# Patient Record
Sex: MALE | Race: WHITE | Employment: FULL TIME | URBAN - NONMETROPOLITAN AREA
[De-identification: names, ages, dates, MRNs, and addresses within clinical notes are randomized per-mention and may not be internally consistent; named-entity substitution may affect disease eponyms.]

---

## 2017-03-24 LAB
ALBUMIN SERPL-MCNC: NORMAL G/DL
ALP BLD-CCNC: NORMAL U/L
ALT SERPL-CCNC: NORMAL U/L
AST SERPL-CCNC: NORMAL U/L
BILIRUB SERPL-MCNC: NORMAL MG/DL (ref 0.1–1.4)
BILIRUBIN, URINE: NORMAL
BLOOD, URINE: NORMAL
BUN BLDV-MCNC: NORMAL MG/DL
CALCIUM SERPL-MCNC: NORMAL MG/DL
CHLORIDE BLD-SCNC: NORMAL MMOL/L
CHOLESTEROL, TOTAL: NORMAL MG/DL
CHOLESTEROL/HDL RATIO: NORMAL
CLARITY: NORMAL
CO2: NORMAL MMOL/L
COLOR: NORMAL
CREAT SERPL-MCNC: NORMAL MG/DL
GFR CALCULATED: NORMAL
GLUCOSE BLD-MCNC: NORMAL MG/DL
GLUCOSE URINE: NORMAL
HDLC SERPL-MCNC: NORMAL MG/DL (ref 35–70)
KETONES, URINE: NORMAL
LDL CHOLESTEROL CALCULATED: NORMAL MG/DL (ref 0–160)
LEUKOCYTE ESTERASE, URINE: NORMAL
NITRITE, URINE: NORMAL
PH UA: NORMAL (ref 4.5–8)
POTASSIUM SERPL-SCNC: NORMAL MMOL/L
PROSTATE SPECIFIC ANTIGEN: 8.22 NG/ML
PROTEIN UA: NORMAL
SODIUM BLD-SCNC: NORMAL MMOL/L
SPECIFIC GRAVITY, URINE: NORMAL
TOTAL PROTEIN: NORMAL
TRIGL SERPL-MCNC: NORMAL MG/DL
UROBILINOGEN, URINE: NORMAL
VLDLC SERPL CALC-MCNC: NORMAL MG/DL

## 2017-03-30 ENCOUNTER — TELEPHONE (OUTPATIENT)
Dept: UROLOGY | Age: 62
End: 2017-03-30

## 2017-03-30 ENCOUNTER — OFFICE VISIT (OUTPATIENT)
Dept: UROLOGY | Age: 62
End: 2017-03-30

## 2017-03-30 VITALS
DIASTOLIC BLOOD PRESSURE: 58 MMHG | HEIGHT: 69 IN | SYSTOLIC BLOOD PRESSURE: 118 MMHG | BODY MASS INDEX: 26.66 KG/M2 | WEIGHT: 180 LBS

## 2017-03-30 DIAGNOSIS — N40.1 BENIGN NON-NODULAR PROSTATIC HYPERPLASIA WITH LOWER URINARY TRACT SYMPTOMS: ICD-10-CM

## 2017-03-30 DIAGNOSIS — R97.20 ELEVATED PSA: Primary | ICD-10-CM

## 2017-03-30 LAB
BILIRUBIN URINE: NEGATIVE
BLOOD URINE, POC: NEGATIVE
CHARACTER, URINE: CLEAR
COLOR, URINE: YELLOW
GLUCOSE URINE: NEGATIVE MG/DL
KETONES, URINE: NEGATIVE
LEUKOCYTE CLUMPS, URINE: NEGATIVE
NITRITE, URINE: NEGATIVE
PH, URINE: 7
PROTEIN, URINE: NEGATIVE MG/DL
SPECIFIC GRAVITY, URINE: 1.02 (ref 1–1.03)
UROBILINOGEN, URINE: 0.2 EU/DL

## 2017-03-30 PROCEDURE — G8484 FLU IMMUNIZE NO ADMIN: HCPCS | Performed by: UROLOGY

## 2017-03-30 PROCEDURE — G8419 CALC BMI OUT NRM PARAM NOF/U: HCPCS | Performed by: UROLOGY

## 2017-03-30 PROCEDURE — 99203 OFFICE O/P NEW LOW 30 MIN: CPT | Performed by: UROLOGY

## 2017-03-30 PROCEDURE — 3017F COLORECTAL CA SCREEN DOC REV: CPT | Performed by: UROLOGY

## 2017-03-30 PROCEDURE — 81003 URINALYSIS AUTO W/O SCOPE: CPT | Performed by: UROLOGY

## 2017-03-30 PROCEDURE — G8427 DOCREV CUR MEDS BY ELIG CLIN: HCPCS | Performed by: UROLOGY

## 2017-03-30 PROCEDURE — 1036F TOBACCO NON-USER: CPT | Performed by: UROLOGY

## 2017-03-30 RX ORDER — GENTAMICIN SULFATE 40 MG/ML
80 INJECTION, SOLUTION INTRAMUSCULAR; INTRAVENOUS ONCE
Qty: 2 ML | Refills: 0 | Status: SHIPPED | OUTPATIENT
Start: 2017-03-30 | End: 2017-03-30

## 2017-03-30 RX ORDER — CIPROFLOXACIN 500 MG/1
500 TABLET, FILM COATED ORAL EVERY 12 HOURS
Qty: 6 TABLET | Refills: 0 | Status: SHIPPED | OUTPATIENT
Start: 2017-03-30 | End: 2017-04-02

## 2017-03-30 ASSESSMENT — ENCOUNTER SYMPTOMS
FACIAL SWELLING: 0
NAUSEA: 0
SHORTNESS OF BREATH: 0
EYE PAIN: 0
BACK PAIN: 0
EYE REDNESS: 0
ABDOMINAL PAIN: 0
COLOR CHANGE: 0
CHEST TIGHTNESS: 0

## 2017-04-13 ENCOUNTER — PROCEDURE VISIT (OUTPATIENT)
Dept: UROLOGY | Age: 62
End: 2017-04-13

## 2017-04-13 VITALS
HEIGHT: 69 IN | BODY MASS INDEX: 26.66 KG/M2 | DIASTOLIC BLOOD PRESSURE: 60 MMHG | WEIGHT: 180 LBS | SYSTOLIC BLOOD PRESSURE: 128 MMHG

## 2017-04-13 DIAGNOSIS — R97.20 ELEVATED PSA: Primary | ICD-10-CM

## 2017-04-13 PROCEDURE — 1036F TOBACCO NON-USER: CPT | Performed by: UROLOGY

## 2017-04-13 PROCEDURE — 99999 PR OFFICE/OUTPT VISIT,PROCEDURE ONLY: CPT | Performed by: UROLOGY

## 2017-04-13 PROCEDURE — 76872 US TRANSRECTAL: CPT | Performed by: UROLOGY

## 2017-04-13 PROCEDURE — 55700 PR BIOPSY OF PROSTATE,NEEDLE/PUNCH: CPT | Performed by: UROLOGY

## 2017-04-13 PROCEDURE — 76942 ECHO GUIDE FOR BIOPSY: CPT | Performed by: UROLOGY

## 2017-04-13 PROCEDURE — 96372 THER/PROPH/DIAG INJ SC/IM: CPT | Performed by: UROLOGY

## 2017-04-13 RX ORDER — GENTAMICIN SULFATE 40 MG/ML
80 INJECTION, SOLUTION INTRAMUSCULAR; INTRAVENOUS ONCE
Status: COMPLETED | OUTPATIENT
Start: 2017-04-13 | End: 2017-04-13

## 2017-04-13 RX ADMIN — GENTAMICIN SULFATE 80 MG: 40 INJECTION, SOLUTION INTRAMUSCULAR; INTRAVENOUS at 16:19

## 2017-04-21 ENCOUNTER — OFFICE VISIT (OUTPATIENT)
Dept: UROLOGY | Age: 62
End: 2017-04-21

## 2017-04-21 VITALS
WEIGHT: 180 LBS | BODY MASS INDEX: 26.66 KG/M2 | DIASTOLIC BLOOD PRESSURE: 68 MMHG | SYSTOLIC BLOOD PRESSURE: 106 MMHG | HEIGHT: 69 IN

## 2017-04-21 DIAGNOSIS — C61 CANCER OF PROSTATE W/MED RECUR RISK (T2B-C OR GLEASON 7 OR PSA 10-20) (HCC): Primary | ICD-10-CM

## 2017-04-21 PROCEDURE — 1036F TOBACCO NON-USER: CPT | Performed by: UROLOGY

## 2017-04-21 PROCEDURE — 3017F COLORECTAL CA SCREEN DOC REV: CPT | Performed by: UROLOGY

## 2017-04-21 PROCEDURE — 99214 OFFICE O/P EST MOD 30 MIN: CPT | Performed by: UROLOGY

## 2017-04-21 PROCEDURE — G8427 DOCREV CUR MEDS BY ELIG CLIN: HCPCS | Performed by: UROLOGY

## 2017-04-21 PROCEDURE — G8419 CALC BMI OUT NRM PARAM NOF/U: HCPCS | Performed by: UROLOGY

## 2017-05-08 ENCOUNTER — OFFICE VISIT (OUTPATIENT)
Dept: UROLOGY | Age: 62
End: 2017-05-08

## 2017-05-08 VITALS
BODY MASS INDEX: 26.5 KG/M2 | WEIGHT: 178.9 LBS | HEIGHT: 69 IN | DIASTOLIC BLOOD PRESSURE: 76 MMHG | SYSTOLIC BLOOD PRESSURE: 124 MMHG

## 2017-05-08 DIAGNOSIS — C61 PROSTATE CANCER (HCC): Primary | ICD-10-CM

## 2017-05-08 LAB
BILIRUBIN, POC: NORMAL
BLOOD URINE, POC: NORMAL
CLARITY, POC: CLEAR
COLOR, POC: YELLOW
GLUCOSE URINE, POC: NORMAL
KETONES, POC: NORMAL
LEUKOCYTE EST, POC: NORMAL
NITRITE, POC: NORMAL
PH, POC: 5.5
PROTEIN, POC: NORMAL
SPECIFIC GRAVITY, POC: <=1.005
UROBILINOGEN, POC: NORMAL

## 2017-05-08 PROCEDURE — 81003 URINALYSIS AUTO W/O SCOPE: CPT | Performed by: UROLOGY

## 2017-05-08 PROCEDURE — 99214 OFFICE O/P EST MOD 30 MIN: CPT | Performed by: UROLOGY

## 2017-05-08 PROCEDURE — G8427 DOCREV CUR MEDS BY ELIG CLIN: HCPCS | Performed by: UROLOGY

## 2017-05-08 PROCEDURE — 1036F TOBACCO NON-USER: CPT | Performed by: UROLOGY

## 2017-05-08 PROCEDURE — G8419 CALC BMI OUT NRM PARAM NOF/U: HCPCS | Performed by: UROLOGY

## 2017-05-08 PROCEDURE — 3017F COLORECTAL CA SCREEN DOC REV: CPT | Performed by: UROLOGY

## 2017-05-09 ENCOUNTER — TELEPHONE (OUTPATIENT)
Dept: UROLOGY | Age: 62
End: 2017-05-09

## 2017-06-26 ENCOUNTER — TELEPHONE (OUTPATIENT)
Dept: UROLOGY | Age: 62
End: 2017-06-26

## 2017-06-26 ENCOUNTER — OFFICE VISIT (OUTPATIENT)
Dept: UROLOGY | Age: 62
End: 2017-06-26

## 2017-06-26 VITALS
BODY MASS INDEX: 26.6 KG/M2 | HEIGHT: 69 IN | DIASTOLIC BLOOD PRESSURE: 80 MMHG | SYSTOLIC BLOOD PRESSURE: 122 MMHG | WEIGHT: 179.6 LBS

## 2017-06-26 DIAGNOSIS — C61 PROSTATE CA (HCC): Primary | ICD-10-CM

## 2017-06-26 LAB
BILIRUBIN, POC: NORMAL
BLOOD URINE, POC: NORMAL
CLARITY, POC: CLEAR
COLOR, POC: YELLOW
GLUCOSE URINE, POC: NORMAL
KETONES, POC: NORMAL
LEUKOCYTE EST, POC: NORMAL
NITRITE, POC: NORMAL
PH, POC: 5.5
PROTEIN, POC: NORMAL
SPECIFIC GRAVITY, POC: 1.02
UROBILINOGEN, POC: NORMAL

## 2017-06-26 PROCEDURE — G8427 DOCREV CUR MEDS BY ELIG CLIN: HCPCS | Performed by: UROLOGY

## 2017-06-26 PROCEDURE — 3017F COLORECTAL CA SCREEN DOC REV: CPT | Performed by: UROLOGY

## 2017-06-26 PROCEDURE — 99214 OFFICE O/P EST MOD 30 MIN: CPT | Performed by: UROLOGY

## 2017-06-26 PROCEDURE — 1036F TOBACCO NON-USER: CPT | Performed by: UROLOGY

## 2017-06-26 PROCEDURE — G8419 CALC BMI OUT NRM PARAM NOF/U: HCPCS | Performed by: UROLOGY

## 2017-06-26 PROCEDURE — 81003 URINALYSIS AUTO W/O SCOPE: CPT | Performed by: UROLOGY

## 2017-06-27 ENCOUNTER — TELEPHONE (OUTPATIENT)
Dept: UROLOGY | Age: 62
End: 2017-06-27

## 2017-06-28 ENCOUNTER — TELEPHONE (OUTPATIENT)
Dept: UROLOGY | Age: 62
End: 2017-06-28

## 2017-06-28 DIAGNOSIS — C61 PROSTATE CANCER (HCC): Primary | ICD-10-CM

## 2017-06-28 DIAGNOSIS — Z01.818 PRE-OP TESTING: ICD-10-CM

## 2017-06-29 ENCOUNTER — TELEPHONE (OUTPATIENT)
Dept: UROLOGY | Age: 62
End: 2017-06-29

## 2017-06-29 LAB
ALBUMIN SERPL-MCNC: 4.2 G/DL
ALP BLD-CCNC: 69 U/L
ALT SERPL-CCNC: 24 U/L
AST SERPL-CCNC: 24 U/L
BASOPHILS ABSOLUTE: 0 /ΜL
BASOPHILS RELATIVE PERCENT: 0.5 %
BILIRUB SERPL-MCNC: 0.5 MG/DL (ref 0.1–1.4)
BILIRUBIN URINE: NORMAL MG/DL
BLOOD, URINE: NEGATIVE
BUN BLDV-MCNC: 15 MG/DL
CALCIUM SERPL-MCNC: 9.1 MG/DL
CHLORIDE BLD-SCNC: 102 MMOL/L
CLARITY: CLEAR
CO2: 26.8 MMOL/L
COLOR: NORMAL
CREAT SERPL-MCNC: 0.8 MG/DL
EOSINOPHILS ABSOLUTE: 0.1 /ΜL
EOSINOPHILS RELATIVE PERCENT: 1.9 %
GFR CALCULATED: NORMAL
GLUCOSE BLD-MCNC: 112 MG/DL
GLUCOSE URINE: NEGATIVE
HCT VFR BLD CALC: 41.2 % (ref 41–53)
HEMOGLOBIN: 13.9 G/DL (ref 13.5–17.5)
KETONES, URINE: NEGATIVE
LEUKOCYTE ESTERASE, URINE: NEGATIVE
LYMPHOCYTES ABSOLUTE: 1.5 /ΜL
LYMPHOCYTES RELATIVE PERCENT: 26.3 %
MCH RBC QN AUTO: 30.9 PG
MCHC RBC AUTO-ENTMCNC: 33.7 G/DL
MCV RBC AUTO: 91.6 FL
MONOCYTES ABSOLUTE: 0.6 /ΜL
MONOCYTES RELATIVE PERCENT: 11 %
NEUTROPHILS ABSOLUTE: 3.5 /ΜL
NEUTROPHILS RELATIVE PERCENT: 60.1 %
NITRITE, URINE: NEGATIVE
PDW BLD-RTO: 13.7 %
PH UA: 6 (ref 4.5–8)
PLATELET # BLD: 238 K/ΜL
PMV BLD AUTO: 10.2 FL
POTASSIUM SERPL-SCNC: 4 MMOL/L
PROTEIN UA: NEGATIVE
RBC # BLD: 4.5 10^6/ΜL
SODIUM BLD-SCNC: 140 MMOL/L
SPECIFIC GRAVITY UA: 1.02 (ref 1–1.03)
TOTAL PROTEIN: 6.5
UROBILINOGEN, URINE: NORMAL
WBC # BLD: 5.8 10^3/ML

## 2017-06-30 ENCOUNTER — TELEPHONE (OUTPATIENT)
Dept: UROLOGY | Age: 62
End: 2017-06-30

## 2017-07-05 ENCOUNTER — TELEPHONE (OUTPATIENT)
Dept: UROLOGY | Age: 62
End: 2017-07-05

## 2017-07-06 ENCOUNTER — TELEPHONE (OUTPATIENT)
Dept: UROLOGY | Age: 62
End: 2017-07-06

## 2017-07-14 ENCOUNTER — HOSPITAL ENCOUNTER (INPATIENT)
Dept: SURGERY | Age: 62
LOS: 2 days | Discharge: HOME | DRG: 708 | End: 2017-07-16
Attending: UROLOGY | Admitting: UROLOGY
Payer: COMMERCIAL

## 2017-07-14 LAB
ABO: NORMAL
ANTIBODY SCREEN: NORMAL
HCT VFR BLD CALC: 43.1 % (ref 42–52)
HEMOGLOBIN: 14.1 GM/DL (ref 14–18)
INR BLD: 0.97 (ref 0.85–1.13)
RH FACTOR: NORMAL

## 2017-07-14 PROCEDURE — 85018 HEMOGLOBIN: CPT

## 2017-07-14 PROCEDURE — 8E0W4CZ ROBOTIC ASSISTED PROCEDURE OF TRUNK REGION, PERCUTANEOUS ENDOSCOPIC APPROACH: ICD-10-PCS | Performed by: UROLOGY

## 2017-07-14 PROCEDURE — 55866 LAPS SURG PRST8ECT RPBIC RAD: CPT | Performed by: UROLOGY

## 2017-07-14 PROCEDURE — 0VT04ZZ RESECTION OF PROSTATE, PERCUTANEOUS ENDOSCOPIC APPROACH: ICD-10-PCS | Performed by: UROLOGY

## 2017-07-14 PROCEDURE — 86900 BLOOD TYPING SEROLOGIC ABO: CPT

## 2017-07-14 PROCEDURE — 86850 RBC ANTIBODY SCREEN: CPT

## 2017-07-14 PROCEDURE — 9990 CHARGE CONVERSION

## 2017-07-14 PROCEDURE — 07BC4ZX EXCISION OF PELVIS LYMPHATIC, PERCUTANEOUS ENDOSCOPIC APPROACH, DIAGNOSTIC: ICD-10-PCS | Performed by: UROLOGY

## 2017-07-14 PROCEDURE — 38571 LAPAROSCOPY LYMPHADENECTOMY: CPT | Performed by: UROLOGY

## 2017-07-14 PROCEDURE — 88309 TISSUE EXAM BY PATHOLOGIST: CPT

## 2017-07-14 PROCEDURE — 85014 HEMATOCRIT: CPT

## 2017-07-14 PROCEDURE — 86901 BLOOD TYPING SEROLOGIC RH(D): CPT

## 2017-07-14 PROCEDURE — 88307 TISSUE EXAM BY PATHOLOGIST: CPT

## 2017-07-14 RX ORDER — FENTANYL CITRATE 50 UG/ML
25 INJECTION, SOLUTION INTRAMUSCULAR; INTRAVENOUS EVERY 5 MIN PRN
Status: DISCONTINUED | OUTPATIENT
Start: 2017-07-14 | End: 2017-07-14

## 2017-07-14 RX ORDER — HYDROCODONE BITARTRATE AND ACETAMINOPHEN 5; 325 MG/1; MG/1
2 TABLET ORAL EVERY 4 HOURS PRN
Status: DISCONTINUED | OUTPATIENT
Start: 2017-07-14 | End: 2017-07-16 | Stop reason: HOSPADM

## 2017-07-14 RX ORDER — HYDROMORPHONE HCL 110MG/55ML
0.5 PATIENT CONTROLLED ANALGESIA SYRINGE INTRAVENOUS EVERY 5 MIN PRN
Status: DISCONTINUED | OUTPATIENT
Start: 2017-07-14 | End: 2017-07-14

## 2017-07-14 RX ORDER — HYDROCODONE BITARTRATE AND ACETAMINOPHEN 5; 325 MG/1; MG/1
1 TABLET ORAL EVERY 4 HOURS PRN
Status: DISCONTINUED | OUTPATIENT
Start: 2017-07-14 | End: 2017-07-16 | Stop reason: HOSPADM

## 2017-07-14 RX ORDER — DIAPER,BRIEF,INFANT-TODD,DISP
EACH MISCELLANEOUS 2 TIMES DAILY
Status: DISCONTINUED | OUTPATIENT
Start: 2017-07-14 | End: 2017-07-16 | Stop reason: HOSPADM

## 2017-07-14 RX ORDER — FENTANYL CITRATE 50 UG/ML
50 INJECTION, SOLUTION INTRAMUSCULAR; INTRAVENOUS EVERY 5 MIN PRN
Status: DISCONTINUED | OUTPATIENT
Start: 2017-07-14 | End: 2017-07-14

## 2017-07-14 RX ORDER — HYDROMORPHONE HCL 110MG/55ML
0.25 PATIENT CONTROLLED ANALGESIA SYRINGE INTRAVENOUS EVERY 5 MIN PRN
Status: DISCONTINUED | OUTPATIENT
Start: 2017-07-14 | End: 2017-07-14

## 2017-07-14 RX ORDER — SODIUM CHLORIDE 9 MG/ML
INJECTION, SOLUTION INTRAVENOUS CONTINUOUS
Status: DISCONTINUED | OUTPATIENT
Start: 2017-07-14 | End: 2017-07-16 | Stop reason: HOSPADM

## 2017-07-14 RX ORDER — ATROPA BELLADONNA AND OPIUM 16.2; 3 MG/1; MG/1
30 SUPPOSITORY RECTAL EVERY 8 HOURS PRN
Status: DISCONTINUED | OUTPATIENT
Start: 2017-07-14 | End: 2017-07-16 | Stop reason: HOSPADM

## 2017-07-14 RX ORDER — PROMETHAZINE HYDROCHLORIDE 25 MG/ML
12.5 INJECTION, SOLUTION INTRAMUSCULAR; INTRAVENOUS
Status: DISCONTINUED | OUTPATIENT
Start: 2017-07-14 | End: 2017-07-14

## 2017-07-14 RX ORDER — SODIUM CHLORIDE 0.9 % (FLUSH) 0.9 %
10 SYRINGE (ML) INJECTION EVERY 12 HOURS SCHEDULED
Status: DISCONTINUED | OUTPATIENT
Start: 2017-07-14 | End: 2017-07-16 | Stop reason: HOSPADM

## 2017-07-14 RX ORDER — MORPHINE SULFATE 4 MG/ML
4 INJECTION, SOLUTION INTRAMUSCULAR; INTRAVENOUS
Status: DISCONTINUED | OUTPATIENT
Start: 2017-07-14 | End: 2017-07-16 | Stop reason: HOSPADM

## 2017-07-14 RX ORDER — SODIUM CHLORIDE 0.9 % (FLUSH) 0.9 %
10 SYRINGE (ML) INJECTION PRN
Status: DISCONTINUED | OUTPATIENT
Start: 2017-07-14 | End: 2017-07-16 | Stop reason: HOSPADM

## 2017-07-14 RX ORDER — KETOROLAC TROMETHAMINE 30 MG/ML
15 INJECTION, SOLUTION INTRAMUSCULAR; INTRAVENOUS EVERY 6 HOURS
Status: DISCONTINUED | OUTPATIENT
Start: 2017-07-14 | End: 2017-07-16 | Stop reason: HOSPADM

## 2017-07-14 RX ORDER — DOCUSATE SODIUM 100 MG/1
100 CAPSULE, LIQUID FILLED ORAL 2 TIMES DAILY
Status: DISCONTINUED | OUTPATIENT
Start: 2017-07-14 | End: 2017-07-16 | Stop reason: HOSPADM

## 2017-07-14 RX ORDER — MEPERIDINE HYDROCHLORIDE 25 MG/ML
25 INJECTION INTRAMUSCULAR; INTRAVENOUS; SUBCUTANEOUS
Status: DISCONTINUED | OUTPATIENT
Start: 2017-07-14 | End: 2017-07-14

## 2017-07-14 RX ORDER — ACETAMINOPHEN 325 MG/1
650 TABLET ORAL EVERY 4 HOURS PRN
Status: DISCONTINUED | OUTPATIENT
Start: 2017-07-14 | End: 2017-07-16 | Stop reason: HOSPADM

## 2017-07-14 RX ORDER — MORPHINE SULFATE 2 MG/ML
2 INJECTION, SOLUTION INTRAMUSCULAR; INTRAVENOUS
Status: DISCONTINUED | OUTPATIENT
Start: 2017-07-14 | End: 2017-07-16 | Stop reason: HOSPADM

## 2017-07-14 RX ORDER — DIPHENHYDRAMINE HYDROCHLORIDE 50 MG/ML
12.5 INJECTION INTRAMUSCULAR; INTRAVENOUS
Status: DISCONTINUED | OUTPATIENT
Start: 2017-07-14 | End: 2017-07-14

## 2017-07-14 RX ORDER — ONDANSETRON 2 MG/ML
4 INJECTION INTRAMUSCULAR; INTRAVENOUS EVERY 6 HOURS PRN
Status: DISCONTINUED | OUTPATIENT
Start: 2017-07-14 | End: 2017-07-16 | Stop reason: HOSPADM

## 2017-07-14 RX ORDER — POTASSIUM CHLORIDE 7.45 MG/ML
10 INJECTION INTRAVENOUS PRN
Status: DISCONTINUED | OUTPATIENT
Start: 2017-07-14 | End: 2017-07-16 | Stop reason: HOSPADM

## 2017-07-14 RX ORDER — LABETALOL HYDROCHLORIDE 5 MG/ML
5 INJECTION, SOLUTION INTRAVENOUS EVERY 10 MIN PRN
Status: DISCONTINUED | OUTPATIENT
Start: 2017-07-14 | End: 2017-07-14

## 2017-07-14 RX ORDER — SODIUM CHLORIDE 9 MG/ML
INJECTION, SOLUTION INTRAVENOUS CONTINUOUS
Status: DISCONTINUED | OUTPATIENT
Start: 2017-07-14 | End: 2017-07-14

## 2017-07-14 RX ADMIN — SODIUM CHLORIDE: 9 INJECTION, SOLUTION INTRAVENOUS at 23:00

## 2017-07-14 RX ADMIN — Medication 0.5 MG: at 18:05

## 2017-07-14 RX ADMIN — Medication 0.5 MG: at 18:13

## 2017-07-14 RX ADMIN — BACITRACIN ZINC 1 G: 500 OINTMENT TOPICAL at 23:07

## 2017-07-14 RX ADMIN — KETOROLAC TROMETHAMINE 15 MG: 30 INJECTION, SOLUTION INTRAMUSCULAR at 23:07

## 2017-07-14 RX ADMIN — DOCUSATE SODIUM 100 MG: 100 CAPSULE ORAL at 23:07

## 2017-07-14 RX ADMIN — SODIUM CHLORIDE: 9 INJECTION, SOLUTION INTRAVENOUS at 11:29

## 2017-07-14 ASSESSMENT — PAIN SCALES - GENERAL
PAINLEVEL_OUTOF10: 6
PAINLEVEL_OUTOF10: 5
PAINLEVEL_OUTOF10: 7
PAINLEVEL_OUTOF10: 5
PAINLEVEL_OUTOF10: 7

## 2017-07-14 ASSESSMENT — PAIN - FUNCTIONAL ASSESSMENT: PAIN_FUNCTIONAL_ASSESSMENT: 0-10

## 2017-07-14 ASSESSMENT — PAIN DESCRIPTION - LOCATION: LOCATION: ABDOMEN

## 2017-07-14 ASSESSMENT — PAIN DESCRIPTION - DESCRIPTORS: DESCRIPTORS: ACHING

## 2017-07-14 ASSESSMENT — PAIN DESCRIPTION - PAIN TYPE: TYPE: ACUTE PAIN

## 2017-07-14 NOTE — OP NOTE
robot port was placed through this incision and the abdomen examined. There was pulsatile swelling obsered in the midline which was thought to be aortic aneurysm and cardiothoracic opinion with Dr Zane Hernandes was taken who felt if was safe to proceed with surgery. Three additional robotic ports and two assistant ports were placed under direct visualization via the robotic camera. There was a robotic port on the right and 2 assistant ports on the right side. There were two robotic ports on the left side. With all ports in place, the camera was removed and the patient was placed in Trendelenburg. The robot was then wheeled in and docked and the procedure began after instruments were passed. Instrumentation included a Prograsp in the fourth arm, a scissor in the right and a Fenestrated Bipolar dissector in the left. The bladder was dropped by going lateral to the median umbilical ligaments on both sides and making a space between the bladder and the anterior abdominal wall. The median umbilical ligaments along with the urachus were cut cranially and then the bladder was peeled off the anterior abdominal wall all the way down to the pubic symphysis. The pelvis was entered and the fat overlying the prostate and endopelvic fascia was carefully cleared off exposing the prostate and bladder neck. The endopelvic fascia was opened first on the right and then on the left sides. This was done bluntly and with cold cutting via the laparoscopic scissor. Very minimal cautery was used as the pelvic floor musculature was all peeled off the endopelvic fascia and the prostate and the prostate was freed up. This was done all the way to the apex of the prostate where the urethra could be visualized and the pelvic floor musculature including the urinary sphincter could also be visualized. The puboprostatic ligaments were cut toward the prostate freeing these up and allowing the apex of the prostate to fall from the distal pelvis. blood vessels were carefully cauterized using bipolar cautery. The neurovascular bundle first on the left and on the right was carefully dissected off of the prostate making sure that we were outside of the prostatic fascia but leaving an adequate neurovascular bundle in order to help with potency and continence. After neurovascular bundles were freed all the way to the apex of the prostate all that remained was the dorsal vein, the urethra and the rectourethralis muscle. The dorsal vein was cut with cautery current freeing up the underlying urethra and allowing the prostate to fall even further from the distal pelvis. This provided some length to the urethra and the urethra was carefully cold cut making sure that we were a few millimeters from the prostate. The underlying rectourethralis muscle was then cut and the prostate completely freed. The prostate was placed in EndoCatch bag along with the fat that had been dissected out at the beginning of the procedure. At this point, the pelvis was washed and dried and then the anastomosis begun. The anastamosis was done using 2/0 double arm quill suture. We began the anastomosis by going outside-in on the bladder neck at the 6 o'clock position and then inside out on the urethra at the same position. 2 additional stitches were placed a 5:30 and 6:30 outside-in on the bladder neck inside out on the urethra. The bladder neck was then parachuted down to the urethra and a very good posterior plate developed. The right-sided suture was held on tension with the fourth arm and them the left-sided suture was run outside-in on the bladder neck, inside out on the urethra all the way around finishing the left side anastomosis to the 12 o'clock position. The right-sided stitch was then released and in like manner run outside-in on the bladder neck and inside out on the urethra all the way to 12 o'clock position.  A transition stitch was performed by one of the sutures at the 12 o'clock position and the anastomosis was tied down, tying across the anastomosis. A new catheter was then passed per urethra into the bladder and bladder filled with 120 ml of sterile saline. As this was happening and anastomosis was being Examined and was found to be watertight. The balloon was inflated with 10-15 ml of sterile saline and placed to gravity drainage. Following the completion of the anastomosis, Bilateral pelvic lymph node dissection was performed. Lymph nodes were taken from approximately the bifurcation of the iliac artery all the way down to the circumflex iliac vein and then down into the obturator fossa. The EndoCatch bag was transferred to a more medial port site to facilitate removal. The robotic instruments were then all removed and the robot was undocked and wheeled away. The air seal port was closed using a quinton deleon closure device. The patient was taken out of Trendelenburg position and the supraumbilical incision was made slightly larger in order to facilitate removal of the prostate in the EndoCatch bag. The prostate was taken out and the supraumbilical incision then run closed using interrupted Vicryl. All incisions were instilled with local anesthetic. The skin incisions were then closed with skin staples. The abdomen was washed and dried and sterile dressings were applied. Mr. Ryan Villagran was awakened in the Operating Room and transferred to the PACU in stable condition. He tolerated the procedure well. There were no complications. Mr. Ryan Villagran tolerated the procedure well. There were no complications.

## 2017-07-14 NOTE — ANESTHESIA POST-OP
Anesthesia Post-op Note    Patient: Shawn Bustos  MRN: [id number]  YOB: 1955  Date of evaluation: 7/14/2017  Time:  7:04 PM     Procedure(s) Performed:     Last Vitals: BP (!) 113/59  Pulse 95  Temp 98 °F (36.7 °C) (Temporal)   Resp 20  Ht 5' 9\" (1.753 m)  Wt 173 lb 3.2 oz (78.6 kg)  SpO2 96%  BMI 25.58 kg/m2    Ene Phase I: Ene Score: 9    Ene Phase II:      Anesthesia Post Evaluation    Final anesthesia type: general  Patient location during evaluation: PACU  Patient participation: complete - patient participated  Level of consciousness: awake and alert  Airway patency: patent  Nausea & Vomiting: no vomiting and no nausea  Complications: no  Cardiovascular status: hemodynamically stable  Respiratory status: acceptable and spontaneous ventilation  Hydration status: stable        Mo Waldrop MD  7:04 PM    [address]  POST-ANESTHESIA NOTE       Name:  Shawn Bustos                                         Age:  58 y.o.   MRN:  [id number]      Last Vitals:  BP (!) 113/59  Pulse 95  Temp 98 °F (36.7 °C) (Temporal)   Resp 20  Ht 5' 9\" (1.753 m)  Wt 173 lb 3.2 oz (78.6 kg)  SpO2 96%  BMI 25.58 kg/m2  Patient Vitals for the past 4 hrs:   BP Temp Temp src Pulse Resp SpO2   07/14/17 1845 (!) 113/59 - - 95 20 96 %   07/14/17 1840 (!) 114/56 - - 93 19 97 %   07/14/17 1835 (!) 104/57 - - 92 20 97 %   07/14/17 1830 (!) 99/56 - - 92 18 98 %   07/14/17 1825 (!) 96/50 - - 96 16 97 %   07/14/17 1820 (!) 107/53 - - 93 19 97 %   07/14/17 1815 (!) 107/52 - - 95 21 96 %   07/14/17 1810 (!) 110/53 - - 98 21 96 %   07/14/17 1805 (!) 109/57 - - 98 15 93 %   07/14/17 1800 (!) 107/58 - - 103 15 96 %   07/14/17 1755 (!) 111/56 - - 105 15 97 %   07/14/17 1750 (!) 111/56 98 °F (36.7 °C) Temporal 106 22 98 %       Level of Consciousness:  Awake    Respiratory:  Stable    Oxygen Saturation:  Stable    Cardiovascular:  Stable    Hydration:  Adequate    PONV:  Stable    Post-op Pain:  Adequate

## 2017-07-14 NOTE — IP AVS SNAPSHOT
topics that may be of help to you. Discharge Instructions            Laparoscopic Radical Prostatectomy: What to Expect at Home  Your Recovery  A laparoscopic radical prostatectomy is surgery to remove the prostate gland. It is done to treat prostate cancer that has not spread beyond the prostate. The doctor made several small cuts, called incisions, in your lower belly. The doctor put a lighted tube (scope) and other surgical tools through the incisions to do the surgery. Or if you had robotic surgery, the doctor guided the robot arms to do this surgery. You may see some bruising and swelling right after your surgery. In the week after surgery, your penis and scrotum may swell. This usually gets better after 1 to 2 weeks. The incisions may be sore for 1 to 2 weeks. Your doctor will give you medicine for pain. You will have a tube (urinary catheter) to drain urine from your bladder for 1 to 2 weeks after surgery. You may have bladder cramps, or spasms, while the catheter is in your bladder. Your doctor can give you medicine to help prevent bladder spasms. After your catheter is removed, it may take several weeks or more for you to control your urine. And it may take 6 months or more for you to be able to have erections again. But with time, most men regain urine control and much of their previous sexual function. If not, medicines or other treatments may help. You will probably be able to go back to work or your usual activities 3 to 5 weeks after surgery. But it can take longer to fully recover. You will need to see your doctor regularly. This includes having blood tests to measure your PSA level. PSA is a substance that can suggest whether your cancer has returned. PSA tests are usually done more often for the first several years after your surgery, but less often after that. Having cancer is scary.  You may feel many emotions and need some help coping. It may help to talk with your family, friends, or a therapist about your feelings. You also can do things at home to make yourself feel better while you go through treatment. Call the [facility] (8-401.207.9702) or visit its Web site at [address] for more information. This care sheet gives you a general idea about how long it will take for you to recover. But each person recovers at a different pace. Follow the steps below to get better as quickly as possible. How can you care for yourself at home? Activity  · Rest when you feel tired. Getting enough sleep will help you recover. · Try to walk each day. Start by walking a little more than you did the day before. Bit by bit, increase the amount you walk. Walking boosts blood flow and helps prevent pneumonia and constipation. At first, avoid hills, and try to stay on flat ground. You can climb stairs, but try to limit how often you do this. When you do climb them, do it slowly, and pause every few steps. · Avoid strenuous activities for 2 weeks, or until your doctor says it is okay. This includes bicycle riding, jogging, weight lifting, or aerobic exercise. · For 2 to 3 weeks or until your doctor says it is okay, avoid lifting anything that would make you strain. This may include a child, heavy grocery bags and milk containers, a heavy briefcase or backpack, cat litter or dog food bags, or a vacuum . · You may shower if your doctor says it is okay. Empty the catheter bag before you start. When you shower, keep the catheter taped to your leg. Do not take a bath until your doctor or nurse has removed the catheter. · Ask your doctor when you can drive again. · Avoid riding in a car for more than 1 hour at a time for the first 3 weeks after surgery. If you must ride in a car for a longer distance, stop often to walk and stretch your legs. · You will probably need to take 3 to 5 weeks off from work.  It depends on · Wash your hands. Bandage care  You may have a bandage. Your doctor will tell you how often to change it. · Wash your hands with soap and water. · Take off the bandage from around the drain. · Clean the drain site and the skin around it with soap and water. Use gauze or a cotton swab. · When the site is dry, put on a new bandage. Drain care  Squeezing or \"milking\" the tube can help prevent clogs so that it drains correctly. Your doctor will tell you when you need to do this. In general, you do this when:  · You see a clot in the tube that is preventing fluid from draining. The clot may look like a dark, stringy lining. · You see fluid leaking around the tube where it goes into the skin. · You think there is no suction in the drain. To milk the tube:  · Use one hand to hold and pinch the tube where it leaves the skin. · With the other hand, pinch the tube with your thumb and first finger just below where you're holding it. · Slowly and firmly push your thumb and first finger down the tubing toward the bulb. · Do this as many times as you need to. The clot should move down the tube and into the bulb. When should you call for help? Call your doctor now or seek immediate medical care if:  · You have signs of infection, such as:  ¨ Increased pain, swelling, warmth, or redness around the area. ¨ Red streaks leading from the area. ¨ Pus draining from the area. ¨ A fever. · You see a sudden change in the color or smell of the drainage. · The tube is coming loose where it leaves your skin. Watch closely for changes in your health, and be sure to contact your doctor if:  · You see a lot of fluid around the drain. · You cannot remove a clot from the tube by milking the tube. Where can you learn more? Go to https://[email or web address]. org and sign in to your [employer] account. Enter K117 in the [employer] box to learn more about \"Surgical Drain Care: Care Instructions. \" If you do not have an account, please click on the \"Sign Up Now\" link. Current as of: May 27, 2016  Content Version: 11.2  © 0251-4271 [employer]. Care instructions adapted under license by [facility] ([facility]). If you have questions about a medical condition or this instruction, always ask your healthcare professional. [address] any warranty or liability for your use of this information. Learning About Urinary Catheter Care to Prevent Infection  What is a urinary catheter? A urinary catheter is a flexible plastic tube used to drain urine from your bladder when you can't urinate on your own. The catheter allows urine to drain from the bladder into a bag. Two types of drainage bags may be used with a urinary catheter. · A bedside bag is a large bag that you can hang on the side of your bed or on a chair. You can use it overnight or anytime you will be sitting or lying down for a long time. · A leg bag is a small bag that you can use during the day. It is usually attached to your thigh or calf and hidden under your clothes. Having a urinary catheter increases your risk of getting a urinary tract infection. Germs may get on the catheter and cause an infection in your bladder or kidneys. The longer you have a catheter, the more likely it is that you will get an infection. You can help prevent this problem with good hygiene and careful handling of your catheter and drainage bags. How can you help prevent infection? Take care to be clean  · Always wash your hands well before and after you handle your catheter. · Clean the skin around the catheter twice a day using soap and water. Dry with a clean towel afterward. You can shower with your catheter and drainage bag in place unless your doctor told you not to. · When you clean around the catheter, check the surrounding skin for signs of infection.  Look for things like pus or irritated, swollen, red, or

## 2017-07-14 NOTE — BRIEF OP NOTE
Brief Postoperative Note    Mitzy Martinez  YOB: 1955  [id number]    Pre-operative Diagnosis: Prostate cancer    Post-operative Diagnosis: Same    Procedure: Robotic assisted laparoscopic radical prostatectomy with bilateral pelvic lymphadenectomy    Anesthesia: General    Surgeons/Assistants: Keeley Umana M.D    Estimated Blood Loss: 604     Complications: None    Specimens: Was Obtained: Prostate and Lymphnodes    Findings: no evidence of extraprostatic extension or enlarged lympnodes    Electronically signed by Keeley Umana MD on 7/14/2017 at 5:44 PM

## 2017-07-15 ENCOUNTER — APPOINTMENT (OUTPATIENT)
Dept: CT IMAGING | Age: 62
DRG: 708 | End: 2017-07-15
Payer: COMMERCIAL

## 2017-07-15 LAB
ANION GAP SERPL CALCULATED.3IONS-SCNC: 12 MEQ/L (ref 8–16)
BUN BLDV-MCNC: 16 MG/DL (ref 7–22)
CALCIUM SERPL-MCNC: 7.9 MG/DL (ref 8.5–10.5)
CHLORIDE BLD-SCNC: 104 MEQ/L (ref 98–111)
CO2: 25 MEQ/L (ref 23–33)
CREAT SERPL-MCNC: 0.9 MG/DL (ref 0.4–1.2)
GFR SERPL CREATININE-BSD FRML MDRD: 85 ML/MIN/1.73M2
GLUCOSE BLD-MCNC: 131 MG/DL (ref 70–108)
HCT VFR BLD CALC: 37.3 % (ref 42–52)
HEMOGLOBIN: 12.5 GM/DL (ref 14–18)
MCH RBC QN AUTO: 31.2 PG (ref 27–31)
MCHC RBC AUTO-ENTMCNC: 33.6 GM/DL (ref 33–37)
MCV RBC AUTO: 92.8 FL (ref 80–94)
PDW BLD-RTO: 14.4 % (ref 11.5–14.5)
PLATELET # BLD: 206 THOU/MM3 (ref 130–400)
PMV BLD AUTO: 8.6 MCM (ref 7.4–10.4)
POTASSIUM SERPL-SCNC: 4.5 MEQ/L (ref 3.5–5.2)
RBC # BLD: 4.02 MILL/MM3 (ref 4.7–6.1)
SODIUM BLD-SCNC: 141 MEQ/L (ref 135–145)
WBC # BLD: 7.7 THOU/MM3 (ref 4.8–10.8)

## 2017-07-15 PROCEDURE — 36415 COLL VENOUS BLD VENIPUNCTURE: CPT

## 2017-07-15 PROCEDURE — B4201ZZ COMPUTERIZED TOMOGRAPHY (CT SCAN) OF ABDOMINAL AORTA USING LOW OSMOLAR CONTRAST: ICD-10-PCS | Performed by: RADIOLOGY

## 2017-07-15 PROCEDURE — 74174 CTA ABD&PLVS W/CONTRAST: CPT

## 2017-07-15 PROCEDURE — 6360000004 HC RX CONTRAST MEDICATION: Performed by: UROLOGY

## 2017-07-15 PROCEDURE — 6370000000 HC RX 637 (ALT 250 FOR IP): Performed by: UROLOGY

## 2017-07-15 PROCEDURE — 6360000002 HC RX W HCPCS: Performed by: UROLOGY

## 2017-07-15 PROCEDURE — 2580000003 HC RX 258: Performed by: UROLOGY

## 2017-07-15 PROCEDURE — 80048 BASIC METABOLIC PNL TOTAL CA: CPT

## 2017-07-15 PROCEDURE — 2140000000 HC CCU INTERMEDIATE R&B

## 2017-07-15 PROCEDURE — 85027 COMPLETE CBC AUTOMATED: CPT

## 2017-07-15 PROCEDURE — 99024 POSTOP FOLLOW-UP VISIT: CPT | Performed by: UROLOGY

## 2017-07-15 RX ADMIN — BACITRACIN ZINC 1 G: 500 OINTMENT TOPICAL at 23:22

## 2017-07-15 RX ADMIN — BACITRACIN ZINC 1 G: 500 OINTMENT TOPICAL at 09:05

## 2017-07-15 RX ADMIN — CEFOXITIN SODIUM 2 G: 1 POWDER, FOR SOLUTION INTRAVENOUS at 09:04

## 2017-07-15 RX ADMIN — CEFOXITIN SODIUM 2 G: 1 POWDER, FOR SOLUTION INTRAVENOUS at 00:50

## 2017-07-15 RX ADMIN — SODIUM CHLORIDE: 9 INJECTION, SOLUTION INTRAVENOUS at 08:57

## 2017-07-15 RX ADMIN — KETOROLAC TROMETHAMINE 15 MG: 30 INJECTION, SOLUTION INTRAMUSCULAR at 17:57

## 2017-07-15 RX ADMIN — HYDROCODONE BITARTRATE AND ACETAMINOPHEN 1 TABLET: 5; 325 TABLET ORAL at 17:56

## 2017-07-15 RX ADMIN — DOCUSATE SODIUM 100 MG: 100 CAPSULE ORAL at 21:34

## 2017-07-15 RX ADMIN — ACETAMINOPHEN 650 MG: 325 TABLET ORAL at 05:34

## 2017-07-15 RX ADMIN — KETOROLAC TROMETHAMINE 15 MG: 30 INJECTION, SOLUTION INTRAMUSCULAR at 23:23

## 2017-07-15 RX ADMIN — KETOROLAC TROMETHAMINE 15 MG: 30 INJECTION, SOLUTION INTRAMUSCULAR at 05:34

## 2017-07-15 RX ADMIN — DOCUSATE SODIUM 100 MG: 100 CAPSULE ORAL at 08:50

## 2017-07-15 RX ADMIN — IOPAMIDOL 85 ML: 755 INJECTION, SOLUTION INTRAVENOUS at 16:22

## 2017-07-15 RX ADMIN — HYDROCODONE BITARTRATE AND ACETAMINOPHEN 1 TABLET: 5; 325 TABLET ORAL at 08:57

## 2017-07-15 RX ADMIN — KETOROLAC TROMETHAMINE 15 MG: 30 INJECTION, SOLUTION INTRAMUSCULAR at 13:06

## 2017-07-15 ASSESSMENT — PAIN DESCRIPTION - PAIN TYPE
TYPE: ACUTE PAIN

## 2017-07-15 ASSESSMENT — PAIN SCALES - GENERAL
PAINLEVEL_OUTOF10: 2
PAINLEVEL_OUTOF10: 4
PAINLEVEL_OUTOF10: 4
PAINLEVEL_OUTOF10: 2
PAINLEVEL_OUTOF10: 3
PAINLEVEL_OUTOF10: 4
PAINLEVEL_OUTOF10: 4
PAINLEVEL_OUTOF10: 5

## 2017-07-15 ASSESSMENT — PAIN DESCRIPTION - ONSET: ONSET: GRADUAL

## 2017-07-15 ASSESSMENT — PAIN DESCRIPTION - DESCRIPTORS: DESCRIPTORS: ACHING

## 2017-07-15 ASSESSMENT — PAIN DESCRIPTION - FREQUENCY: FREQUENCY: INTERMITTENT

## 2017-07-15 ASSESSMENT — PAIN DESCRIPTION - LOCATION
LOCATION: ABDOMEN
LOCATION: ABDOMEN
LOCATION: HEAD

## 2017-07-15 NOTE — PROGRESS NOTES
Chief Complains:    Patient Active Problem List   Diagnosis    Cancer of prostate w/med recur risk (T2b-c or Alia 7 or PSA 10-20) ([facility] [address]       Doing okay. No BM or flatus yet. On 3B due to concern for AAA. Will need CT scan. Vitals:    07/15/17 0550 07/15/17 0608 07/15/17 0730 07/15/17 1215   BP:  (!) 99/58 (!) 100/58 (!) 94/56   Pulse:  61 72 62   Resp:  16 16 16   Temp:  98.1 °F (36.7 °C) 98.2 °F (36.8 °C) 98.5 °F (36.9 °C)   TempSrc:  Oral Oral Oral   SpO2:  97% 94% 92%   Weight: 182 lb 1.6 oz (82.6 kg)      Height:             Intake/Output Summary (Last 24 hours) at 07/15/17 1247  Last data filed at 07/15/17 0550   Gross per 24 hour   Intake          5017.51 ml   Output             1710 ml   Net          3307.51 ml       Labs  Recent Labs      07/14/17   2009  07/15/17   0404   WBC   --   7.7   HGB  14.1  12.5*   HCT  43.1  37.3*   PLT   --   206   NA   --   141   K   --   4.5   CL   --   104   CO2   --   25   BUN   --   16   CREATININE   --   0.9   CALCIUM   --   7.9*        Exam  General: awake, alert and NAD. Abdomen: soft and non-tender  Ext: calves soft and non-tender  : gore in place and urine clearing, very light pink    Interim progress notes reviewed      Assessment and Plan  Diagnosis: prostate cancer post RALRP. Question of AAA intra-operatively. Needs CT today. Disposition pending CT.

## 2017-07-15 NOTE — PLAN OF CARE
Problem: Pain Control  Goal: Maintain pain level at or below patients acceptable level (or 5 if patient is unable to determine acceptable level)  Outcome: Not Met This Shift  Prn pain medication being provided to patient to control pain. Problem:   Goal: Adequate urinary output  Outcome: Ongoing  Patient is havinn adequate urine output, will continue to monitor. Problem: Skin Integrity/Risk  Goal: No skin breakdown during hospitalization  Outcome: Ongoing  Patient has incisions, otherwise skin integrity is intact. Problem: Falls - Risk of  Goal: Absence of falls  Outcome: Ongoing  Fall risk precautions in place, patient alert and oriented. Comments:   Care plan reviewed with patient. Patient verbalizes understanding of the care plan and contributed to goal setting.

## 2017-07-15 NOTE — PLAN OF CARE
Problem: Pain Control  Goal: Maintain pain level at or below patients acceptable level (or 5 if patient is unable to determine acceptable level)  Outcome: Ongoing  Patient complains of pain in abdomen. Scheduled IV Toradol given for pain. Problem:   Goal: Adequate urinary output  Outcome: Ongoing  Patient has gore catheter in place draining trent urine. Problem: Skin Integrity/Risk  Goal: No skin breakdown during hospitalization  Outcome: Ongoing  Patient has 5 stab sites on abdomen with dressings clean, dry, and intact. Comments:   Care plan reviewed with patient. Patient verbalizes understanding of the plan of care and contribute to goal setting.

## 2017-07-16 VITALS
RESPIRATION RATE: 18 BRPM | HEART RATE: 56 BPM | TEMPERATURE: 97.8 F | BODY MASS INDEX: 27 KG/M2 | DIASTOLIC BLOOD PRESSURE: 71 MMHG | OXYGEN SATURATION: 94 % | SYSTOLIC BLOOD PRESSURE: 116 MMHG | WEIGHT: 182.3 LBS | HEIGHT: 69 IN

## 2017-07-16 PROCEDURE — 2580000003 HC RX 258: Performed by: UROLOGY

## 2017-07-16 PROCEDURE — 6370000000 HC RX 637 (ALT 250 FOR IP): Performed by: UROLOGY

## 2017-07-16 PROCEDURE — 6360000002 HC RX W HCPCS: Performed by: UROLOGY

## 2017-07-16 PROCEDURE — 99024 POSTOP FOLLOW-UP VISIT: CPT | Performed by: UROLOGY

## 2017-07-16 RX ORDER — CIPROFLOXACIN 500 MG/1
500 TABLET, FILM COATED ORAL EVERY 12 HOURS
Qty: 12 TABLET | Refills: 0 | Status: SHIPPED | OUTPATIENT
Start: 2017-07-16 | End: 2017-10-23 | Stop reason: ALTCHOICE

## 2017-07-16 RX ORDER — HYDROCODONE BITARTRATE AND ACETAMINOPHEN 5; 325 MG/1; MG/1
1 TABLET ORAL EVERY 6 HOURS PRN
Qty: 10 TABLET | Refills: 0 | Status: SHIPPED | OUTPATIENT
Start: 2017-07-16 | End: 2017-07-19

## 2017-07-16 RX ORDER — DOCUSATE SODIUM 100 MG/1
100 CAPSULE, LIQUID FILLED ORAL DAILY PRN
Qty: 30 CAPSULE | Refills: 1 | Status: SHIPPED | OUTPATIENT
Start: 2017-07-16 | End: 2017-10-23 | Stop reason: ALTCHOICE

## 2017-07-16 RX ADMIN — DOCUSATE SODIUM 100 MG: 100 CAPSULE ORAL at 09:36

## 2017-07-16 RX ADMIN — ACETAMINOPHEN 650 MG: 325 TABLET ORAL at 07:13

## 2017-07-16 RX ADMIN — SODIUM CHLORIDE: 9 INJECTION, SOLUTION INTRAVENOUS at 10:43

## 2017-07-16 RX ADMIN — KETOROLAC TROMETHAMINE 15 MG: 30 INJECTION, SOLUTION INTRAMUSCULAR at 11:32

## 2017-07-16 RX ADMIN — KETOROLAC TROMETHAMINE 15 MG: 30 INJECTION, SOLUTION INTRAMUSCULAR at 05:01

## 2017-07-16 ASSESSMENT — PAIN DESCRIPTION - LOCATION: LOCATION: HEAD

## 2017-07-16 ASSESSMENT — PAIN DESCRIPTION - PAIN TYPE: TYPE: ACUTE PAIN

## 2017-07-16 ASSESSMENT — PAIN SCALES - GENERAL
PAINLEVEL_OUTOF10: 5
PAINLEVEL_OUTOF10: 6
PAINLEVEL_OUTOF10: 3
PAINLEVEL_OUTOF10: 1

## 2017-07-16 NOTE — PLAN OF CARE
Problem: Pain Control  Goal: Maintain pain level at or below patients acceptable level (or 5 if patient is unable to determine acceptable level)  Outcome: Ongoing  Patient has had 3/10 pain at the most this shift. Patient has norco as needed for pain and toradol scheduled for pain     Problem:   Goal: Adequate urinary output  Outcome: Ongoing  Patient has gore and adequate output this shift     Problem: Skin Integrity/Risk  Goal: No skin breakdown during hospitalization  Outcome: Ongoing  Patient has no skin breakdown at this time on this hospital stay. Patient walks occasionally and sits in the chair     Problem: Falls - Risk of  Goal: Absence of falls  Outcome: Ongoing  Patient is steady on feet and has no falls this shift     Problem: Pain:  Goal: Pain level will decrease  Pain level will decrease   Outcome: Ongoing  Patient pain level has decreased since surgery and has pain meds in [facility] as needed     Comments:   Care plan reviewed with patient. Patient verbalize understanding of the plan of care and contribute to goal setting.

## 2017-07-16 NOTE — PROGRESS NOTES
Chief Complains:    Patient Active Problem List   Diagnosis    Cancer of prostate w/med recur risk (T2b-c or Alia 7 or PSA 10-20) ([facility] [address]       Doing okay. Passing flatus. No pain and no distention. Ready for discharge. Vitals:    07/15/17 2322 07/16/17 0444 07/16/17 0744 07/16/17 1212   BP: 120/70 118/64 129/72 116/71   Pulse: 64 75 66 56   Resp: 20 18 18 18   Temp: 97.9 °F (36.6 °C) 99 °F (37.2 °C) 98.7 °F (37.1 °C) 97.8 °F (36.6 °C)   TempSrc: Oral Oral Oral Axillary   SpO2: 93% 90% 91% 94%   Weight:  182 lb 4.8 oz (82.7 kg)     Height:             Intake/Output Summary (Last 24 hours) at 07/16/17 1217  Last data filed at 07/16/17 0529   Gross per 24 hour   Intake          4046.68 ml   Output             1670 ml   Net          2376.68 ml       Labs  Recent Labs      07/14/17   2009  07/15/17   0404   WBC   --   7.7   HGB  14.1  12.5*   HCT  43.1  37.3*   PLT   --   206   NA   --   141   K   --   4.5   CL   --   104   CO2   --   25   BUN   --   16   CREATININE   --   0.9   CALCIUM   --   7.9*        Exam  General: awake, alert and NAD. Abdomen: soft and non-tender  Ext: calves soft and non-tender  : gore in place and urine clear    Radiology  The patient has had a CT scan that I have reviewed. The study demonstrates no evidence of AAA. Interim progress notes reviewed      Assessment and Plan  Diagnosis: prostate cancer post RALRP    Doing well. Okay for discharge. Follow up with Dr. Quyen Paul as scheduled.

## 2017-07-17 ENCOUNTER — TELEPHONE (OUTPATIENT)
Dept: UROLOGY | Age: 62
End: 2017-07-17

## 2017-07-19 ENCOUNTER — NURSE TRIAGE (OUTPATIENT)
Dept: ADMINISTRATIVE | Age: 62
End: 2017-07-19

## 2017-07-20 ENCOUNTER — TELEPHONE (OUTPATIENT)
Dept: UROLOGY | Age: 62
End: 2017-07-20

## 2017-07-20 DIAGNOSIS — C61 CANCER OF PROSTATE W/MED RECUR RISK (T2B-C OR GLEASON 7 OR PSA 10-20) (HCC): Primary | ICD-10-CM

## 2017-07-21 ENCOUNTER — OFFICE VISIT (OUTPATIENT)
Dept: UROLOGY | Age: 62
End: 2017-07-21

## 2017-07-21 ENCOUNTER — HOSPITAL ENCOUNTER (OUTPATIENT)
Dept: GENERAL RADIOLOGY | Age: 62
Discharge: HOME | DRG: 708 | End: 2017-07-21
Payer: COMMERCIAL

## 2017-07-21 VITALS
SYSTOLIC BLOOD PRESSURE: 104 MMHG | HEIGHT: 69 IN | DIASTOLIC BLOOD PRESSURE: 68 MMHG | WEIGHT: 180 LBS | BODY MASS INDEX: 26.66 KG/M2

## 2017-07-21 DIAGNOSIS — C61 CANCER OF PROSTATE W/MED RECUR RISK (T2B-C OR GLEASON 7 OR PSA 10-20) (HCC): ICD-10-CM

## 2017-07-21 DIAGNOSIS — C61 CANCER OF PROSTATE W/MED RECUR RISK (T2B-C OR GLEASON 7 OR PSA 10-20) (HCC): Primary | ICD-10-CM

## 2017-07-21 PROCEDURE — 74430 CONTRAST X-RAY BLADDER: CPT

## 2017-07-21 PROCEDURE — 6360000004 HC RX CONTRAST MEDICATION: Performed by: UROLOGY

## 2017-07-21 PROCEDURE — 99024 POSTOP FOLLOW-UP VISIT: CPT | Performed by: UROLOGY

## 2017-07-21 PROCEDURE — 51600 INJECTION FOR BLADDER X-RAY: CPT

## 2017-07-21 RX ORDER — HYDROCODONE BITARTRATE AND ACETAMINOPHEN 5; 325 MG/1; MG/1
1 TABLET ORAL EVERY 6 HOURS PRN
COMMUNITY
End: 2018-02-22

## 2017-07-21 RX ADMIN — IOTHALAMATE MEGLUMINE 250 ML: 172 INJECTION URETERAL at 10:08

## 2017-08-04 ENCOUNTER — TELEPHONE (OUTPATIENT)
Dept: UROLOGY | Age: 62
End: 2017-08-04

## 2017-10-09 ENCOUNTER — TELEPHONE (OUTPATIENT)
Dept: UROLOGY | Age: 62
End: 2017-10-09

## 2017-10-10 LAB — PROSTATE SPECIFIC ANTIGEN: NORMAL NG/ML

## 2017-10-23 ENCOUNTER — OFFICE VISIT (OUTPATIENT)
Dept: UROLOGY | Age: 62
End: 2017-10-23
Payer: COMMERCIAL

## 2017-10-23 VITALS
WEIGHT: 177.2 LBS | SYSTOLIC BLOOD PRESSURE: 126 MMHG | HEIGHT: 69 IN | BODY MASS INDEX: 26.25 KG/M2 | DIASTOLIC BLOOD PRESSURE: 78 MMHG

## 2017-10-23 DIAGNOSIS — C61 CANCER OF PROSTATE W/MED RECUR RISK (T2B-C OR GLEASON 7 OR PSA 10-20) (HCC): Primary | ICD-10-CM

## 2017-10-23 DIAGNOSIS — N52.31 ERECTILE DYSFUNCTION FOLLOWING RADICAL PROSTATECTOMY: ICD-10-CM

## 2017-10-23 PROCEDURE — G8417 CALC BMI ABV UP PARAM F/U: HCPCS | Performed by: UROLOGY

## 2017-10-23 PROCEDURE — 81003 URINALYSIS AUTO W/O SCOPE: CPT | Performed by: UROLOGY

## 2017-10-23 PROCEDURE — G8427 DOCREV CUR MEDS BY ELIG CLIN: HCPCS | Performed by: UROLOGY

## 2017-10-23 PROCEDURE — G8484 FLU IMMUNIZE NO ADMIN: HCPCS | Performed by: UROLOGY

## 2017-10-23 PROCEDURE — 3017F COLORECTAL CA SCREEN DOC REV: CPT | Performed by: UROLOGY

## 2017-10-23 PROCEDURE — 99213 OFFICE O/P EST LOW 20 MIN: CPT | Performed by: UROLOGY

## 2017-10-23 PROCEDURE — 1036F TOBACCO NON-USER: CPT | Performed by: UROLOGY

## 2017-10-23 RX ORDER — TADALAFIL 5 MG
5 TABLET ORAL PRN
Qty: 30 TABLET | Refills: 3 | Status: SHIPPED | OUTPATIENT
Start: 2017-10-23 | End: 2020-03-17

## 2017-10-23 NOTE — PROGRESS NOTES
He was initiated on Cialis for is ED. I also spent a significant amount of time discussing his prescription medications, specifically his Cialis. We discussed possible side effects and the way the medication(s) work(s). We will proceed with trimix injections if Cialis doesnt work. Return in about 3 months (around 1/23/2018), or if symptoms worsen or fail to improve, for Prostate cancer.   Medication Ordered:  Orders Placed This Encounter   Medications    CIALIS 5 MG tablet     Sig: Take 1 tablet by mouth as needed for Erectile Dysfunction     Dispense:  30 tablet     Refill:  3     Orders Placed:  Orders Placed This Encounter   Procedures    PSA Prostatic Specific Antigen     Standing Status:   Future     Standing Expiration Date:   10/23/2018    POCT Urinalysis No Micro (Auto)       Electronically signed by Marylou Najjar, MD on 10/23/2017 at 12:37 PM

## 2018-02-22 ENCOUNTER — OFFICE VISIT (OUTPATIENT)
Dept: UROLOGY | Age: 63
End: 2018-02-22
Payer: COMMERCIAL

## 2018-02-22 VITALS
BODY MASS INDEX: 28.56 KG/M2 | WEIGHT: 182 LBS | DIASTOLIC BLOOD PRESSURE: 80 MMHG | HEIGHT: 67 IN | SYSTOLIC BLOOD PRESSURE: 108 MMHG

## 2018-02-22 DIAGNOSIS — C61 CANCER OF PROSTATE W/MED RECUR RISK (T2B-C OR GLEASON 7 OR PSA 10-20) (HCC): Primary | ICD-10-CM

## 2018-02-22 LAB
BILIRUBIN, POC: NORMAL
BLOOD URINE, POC: NORMAL
CLARITY, POC: NORMAL
COLOR, POC: NORMAL
GLUCOSE URINE, POC: NORMAL
KETONES, POC: NORMAL
LEUKOCYTE EST, POC: NORMAL
NITRITE, POC: NORMAL
PH, POC: 5.5
PROTEIN, POC: NORMAL
SPECIFIC GRAVITY, POC: 1.02
UROBILINOGEN, POC: 0.2

## 2018-02-22 PROCEDURE — 99214 OFFICE O/P EST MOD 30 MIN: CPT | Performed by: NURSE PRACTITIONER

## 2018-02-22 PROCEDURE — G8484 FLU IMMUNIZE NO ADMIN: HCPCS | Performed by: NURSE PRACTITIONER

## 2018-02-22 PROCEDURE — G8427 DOCREV CUR MEDS BY ELIG CLIN: HCPCS | Performed by: NURSE PRACTITIONER

## 2018-02-22 PROCEDURE — G8417 CALC BMI ABV UP PARAM F/U: HCPCS | Performed by: NURSE PRACTITIONER

## 2018-02-22 PROCEDURE — 3017F COLORECTAL CA SCREEN DOC REV: CPT | Performed by: NURSE PRACTITIONER

## 2018-02-22 PROCEDURE — 81003 URINALYSIS AUTO W/O SCOPE: CPT | Performed by: NURSE PRACTITIONER

## 2018-02-22 PROCEDURE — 1036F TOBACCO NON-USER: CPT | Performed by: NURSE PRACTITIONER

## 2018-02-22 ASSESSMENT — ENCOUNTER SYMPTOMS
VOMITING: 0
ABDOMINAL PAIN: 0
NAUSEA: 0

## 2018-05-23 LAB — PROSTATE SPECIFIC ANTIGEN: NORMAL NG/ML

## 2018-05-24 ENCOUNTER — OFFICE VISIT (OUTPATIENT)
Dept: UROLOGY | Age: 63
End: 2018-05-24
Payer: COMMERCIAL

## 2018-05-24 VITALS
BODY MASS INDEX: 26.96 KG/M2 | WEIGHT: 182 LBS | HEIGHT: 69 IN | SYSTOLIC BLOOD PRESSURE: 118 MMHG | DIASTOLIC BLOOD PRESSURE: 74 MMHG

## 2018-05-24 DIAGNOSIS — C61 PROSTATE CA (HCC): Primary | ICD-10-CM

## 2018-05-24 LAB
BILIRUBIN, POC: NORMAL
BLOOD URINE, POC: NORMAL
CLARITY, POC: CLEAR
COLOR, POC: YELLOW
GLUCOSE URINE, POC: NORMAL
KETONES, POC: NORMAL
LEUKOCYTE EST, POC: NORMAL
NITRITE, POC: NORMAL
PH, POC: 5
PROTEIN, POC: NORMAL
SPECIFIC GRAVITY, POC: 1.02
UROBILINOGEN, POC: 0.2

## 2018-05-24 PROCEDURE — 1036F TOBACCO NON-USER: CPT | Performed by: NURSE PRACTITIONER

## 2018-05-24 PROCEDURE — 3017F COLORECTAL CA SCREEN DOC REV: CPT | Performed by: NURSE PRACTITIONER

## 2018-05-24 PROCEDURE — 99213 OFFICE O/P EST LOW 20 MIN: CPT | Performed by: NURSE PRACTITIONER

## 2018-05-24 PROCEDURE — G8417 CALC BMI ABV UP PARAM F/U: HCPCS | Performed by: NURSE PRACTITIONER

## 2018-05-24 PROCEDURE — 81003 URINALYSIS AUTO W/O SCOPE: CPT | Performed by: NURSE PRACTITIONER

## 2018-05-24 PROCEDURE — G8427 DOCREV CUR MEDS BY ELIG CLIN: HCPCS | Performed by: NURSE PRACTITIONER

## 2018-05-24 ASSESSMENT — ENCOUNTER SYMPTOMS
NAUSEA: 0
VOMITING: 0
ABDOMINAL PAIN: 0

## 2018-09-11 LAB — PROSTATE SPECIFIC ANTIGEN: NORMAL NG/ML

## 2018-09-13 ENCOUNTER — OFFICE VISIT (OUTPATIENT)
Dept: UROLOGY | Age: 63
End: 2018-09-13
Payer: COMMERCIAL

## 2018-09-13 VITALS
HEIGHT: 68 IN | BODY MASS INDEX: 26.83 KG/M2 | SYSTOLIC BLOOD PRESSURE: 110 MMHG | DIASTOLIC BLOOD PRESSURE: 62 MMHG | WEIGHT: 177 LBS

## 2018-09-13 DIAGNOSIS — C61 PROSTATE CA (HCC): Primary | ICD-10-CM

## 2018-09-13 LAB
BILIRUBIN, POC: NORMAL
BLOOD URINE, POC: NORMAL
CLARITY, POC: CLEAR
COLOR, POC: YELLOW
GLUCOSE URINE, POC: NORMAL
KETONES, POC: NORMAL
LEUKOCYTE EST, POC: NORMAL
NITRITE, POC: NORMAL
PH, POC: 5.5
PROTEIN, POC: NORMAL
SPECIFIC GRAVITY, POC: 1.02
UROBILINOGEN, POC: 0.2

## 2018-09-13 PROCEDURE — 99213 OFFICE O/P EST LOW 20 MIN: CPT | Performed by: NURSE PRACTITIONER

## 2018-09-13 PROCEDURE — 1036F TOBACCO NON-USER: CPT | Performed by: NURSE PRACTITIONER

## 2018-09-13 PROCEDURE — G8427 DOCREV CUR MEDS BY ELIG CLIN: HCPCS | Performed by: NURSE PRACTITIONER

## 2018-09-13 PROCEDURE — 3017F COLORECTAL CA SCREEN DOC REV: CPT | Performed by: NURSE PRACTITIONER

## 2018-09-13 PROCEDURE — 81003 URINALYSIS AUTO W/O SCOPE: CPT | Performed by: NURSE PRACTITIONER

## 2018-09-13 PROCEDURE — G8417 CALC BMI ABV UP PARAM F/U: HCPCS | Performed by: NURSE PRACTITIONER

## 2018-09-13 ASSESSMENT — ENCOUNTER SYMPTOMS
VOMITING: 0
ABDOMINAL PAIN: 0
NAUSEA: 0

## 2019-01-17 NOTE — PROGRESS NOTES
Health Maintenance Due   Topic Date Due    Hepatitis C Screening  1960    Pap Smear  06/23/2018    Influenza Vaccine  08/01/2018     Updated csope with media report   Pt to OR @ 8222.

## 2019-03-13 LAB
ALBUMIN SERPL-MCNC: 4.3 G/DL
ALP BLD-CCNC: 66 U/L
ALT SERPL-CCNC: 30 U/L
ANION GAP SERPL CALCULATED.3IONS-SCNC: NORMAL MMOL/L
AST SERPL-CCNC: 29 U/L
BASOPHILS ABSOLUTE: 0 /ΜL
BASOPHILS RELATIVE PERCENT: 0.5 %
BILIRUB SERPL-MCNC: 0.4 MG/DL (ref 0.1–1.4)
BUN BLDV-MCNC: 17 MG/DL
CALCIUM SERPL-MCNC: 9.1 MG/DL
CHLORIDE BLD-SCNC: 104 MMOL/L
CHOLESTEROL, TOTAL: 145 MG/DL
CHOLESTEROL/HDL RATIO: NORMAL
CO2: 27 MMOL/L
CREAT SERPL-MCNC: 0.9 MG/DL
EOSINOPHILS ABSOLUTE: 0.1 /ΜL
EOSINOPHILS RELATIVE PERCENT: 2.6 %
GFR CALCULATED: NORMAL
GLUCOSE BLD-MCNC: 108 MG/DL
HCT VFR BLD CALC: 40.2 % (ref 41–53)
HDLC SERPL-MCNC: 39 MG/DL (ref 35–70)
HEMOGLOBIN: 13.3 G/DL (ref 13.5–17.5)
LDL CHOLESTEROL CALCULATED: 91 MG/DL (ref 0–160)
LYMPHOCYTES ABSOLUTE: 1.3 /ΜL
LYMPHOCYTES RELATIVE PERCENT: 30.3 %
MCH RBC QN AUTO: 29.1 PG
MCHC RBC AUTO-ENTMCNC: 33.1 G/DL
MCV RBC AUTO: 88 FL
MONOCYTES ABSOLUTE: 0.5 /ΜL
MONOCYTES RELATIVE PERCENT: 10.5 %
NEUTROPHILS ABSOLUTE: 2.4 /ΜL
NEUTROPHILS RELATIVE PERCENT: 55.9 %
PDW BLD-RTO: 14.2 %
PLATELET # BLD: 236 K/ΜL
PMV BLD AUTO: 10 FL
POTASSIUM SERPL-SCNC: 4.6 MMOL/L
PROSTATE SPECIFIC ANTIGEN: NORMAL NG/ML
RBC # BLD: 4.57 10^6/ΜL
SODIUM BLD-SCNC: 141 MMOL/L
TOTAL PROTEIN: 6.8
TRIGL SERPL-MCNC: 75 MG/DL
VITAMIN B-12: 430
VLDLC SERPL CALC-MCNC: 15 MG/DL
WBC # BLD: 4.3 10^3/ML

## 2019-03-14 ENCOUNTER — OFFICE VISIT (OUTPATIENT)
Dept: UROLOGY | Age: 64
End: 2019-03-14
Payer: COMMERCIAL

## 2019-03-14 VITALS
WEIGHT: 184 LBS | SYSTOLIC BLOOD PRESSURE: 124 MMHG | HEIGHT: 69 IN | BODY MASS INDEX: 27.25 KG/M2 | DIASTOLIC BLOOD PRESSURE: 70 MMHG

## 2019-03-14 DIAGNOSIS — C61 PROSTATE CA (HCC): Primary | ICD-10-CM

## 2019-03-14 LAB
BILIRUBIN, POC: NORMAL
BLOOD URINE, POC: NORMAL
CLARITY, POC: CLEAR
COLOR, POC: YELLOW
GLUCOSE URINE, POC: NORMAL
KETONES, POC: NORMAL
LEUKOCYTE EST, POC: NORMAL
NITRITE, POC: NORMAL
PH, POC: 6.5
PROTEIN, POC: NORMAL
SPECIFIC GRAVITY, POC: 1.02
UROBILINOGEN, POC: 1

## 2019-03-14 PROCEDURE — 99213 OFFICE O/P EST LOW 20 MIN: CPT | Performed by: NURSE PRACTITIONER

## 2019-03-14 PROCEDURE — G8484 FLU IMMUNIZE NO ADMIN: HCPCS | Performed by: NURSE PRACTITIONER

## 2019-03-14 PROCEDURE — G8417 CALC BMI ABV UP PARAM F/U: HCPCS | Performed by: NURSE PRACTITIONER

## 2019-03-14 PROCEDURE — 81003 URINALYSIS AUTO W/O SCOPE: CPT | Performed by: NURSE PRACTITIONER

## 2019-03-14 PROCEDURE — 1036F TOBACCO NON-USER: CPT | Performed by: NURSE PRACTITIONER

## 2019-03-14 PROCEDURE — 3017F COLORECTAL CA SCREEN DOC REV: CPT | Performed by: NURSE PRACTITIONER

## 2019-03-14 PROCEDURE — G8427 DOCREV CUR MEDS BY ELIG CLIN: HCPCS | Performed by: NURSE PRACTITIONER

## 2019-03-14 RX ORDER — SILDENAFIL 100 MG/1
100 TABLET, FILM COATED ORAL DAILY PRN
Qty: 10 TABLET | Refills: 3 | Status: SHIPPED | OUTPATIENT
Start: 2019-03-14 | End: 2020-03-17

## 2019-03-14 ASSESSMENT — ENCOUNTER SYMPTOMS
ABDOMINAL PAIN: 0
VOMITING: 0
NAUSEA: 0

## 2019-12-20 DIAGNOSIS — C61 PROSTATE CA (HCC): Primary | ICD-10-CM

## 2020-03-11 LAB — PROSTATE SPECIFIC ANTIGEN: NORMAL

## 2020-03-17 ENCOUNTER — TELEPHONE (OUTPATIENT)
Dept: UROLOGY | Age: 65
End: 2020-03-17

## 2020-03-17 RX ORDER — TADALAFIL 20 MG/1
20 TABLET ORAL PRN
Qty: 10 TABLET | Refills: 1 | Status: SHIPPED | OUTPATIENT
Start: 2020-03-17 | End: 2020-07-09

## 2020-03-17 NOTE — TELEPHONE ENCOUNTER
Contacted the patient to cancel the appointment in [facility] on 03/18/20 due to the Covid-19 virus. Notified the patient that he will be contacted by phone by Brandon Cabrera CNP within the next few days to discuss the office visit. Patient voiced understanding. Appointment was canceled.

## 2020-03-17 NOTE — TELEPHONE ENCOUNTER
Contacted pt to discuss rescheduling routine office visit secondary to COVID-19. Pt has a hx of prostate cancer s/p RALRP 7/2017. [id number]. PSA 3/13/19 <0.03. Notes he would like to trial medication for erectile dysfunction. Has tried generic viagra in the past.  Will send script for generic cialis to [facility] in [place] [address]. Staff to mail a goodrx coupon to his home. Discussed proper administration and possible side effects. Staff to please schedule OV in 6 months with PSA few days prior to appt. Obtain recent PSA results from [facility] (we will call pt with results once we receive them). Mail PSA order and Goodrx coupon to patient's home. Thank-you.

## 2020-03-18 NOTE — TELEPHONE ENCOUNTER
Attempted to call the patient and left a message for him to call the office for his psa results and to set up an appointment to see [facility] in 6 months. Also, I placed his psa order and the good rx coupon in the mail to his current address in [employer].

## 2020-07-09 RX ORDER — TADALAFIL 20 MG/1
TABLET ORAL
Qty: 10 TABLET | Refills: 0 | Status: SHIPPED | OUTPATIENT
Start: 2020-07-09 | End: 2020-08-04

## 2020-07-09 NOTE — TELEPHONE ENCOUNTER
Prescription Refill Request    The pharmacy is requesting a refill of tadalafil 20 mg, taken prn  Last prescribed on 03/17/2020    Last office visit 03/14/2019 with provider Flor Celeste CNP  Next office visit 09/23/2020 with provider Den Partida CNP    Please send refill to [facility]    Thank you.

## 2020-08-03 NOTE — TELEPHONE ENCOUNTER
Christy Pardon called requesting a refill on the following medications:  Requested Prescriptions     Pending Prescriptions Disp Refills    tadalafil (CIALIS) 20 MG tablet [Pharmacy Med Name: TADALAFIL 20 MG TABLET] 10 tablet 0     Sig: TAKE ONE TABLET BY MOUTH AS NEEDED FOR ERECTILE DYSFUNCTION.  DO NOT TAKE MORE FREQUENTLY THAN EVERY 48 HOURS     Pharmacy verified:  [email or web address]      Date of last visit: 03.14.19    Date of next visit (if applicable): Visit date not found

## 2020-08-04 RX ORDER — TADALAFIL 20 MG/1
TABLET ORAL
Qty: 10 TABLET | Refills: 0 | Status: SHIPPED | OUTPATIENT
Start: 2020-08-04 | End: 2020-09-08

## 2020-09-08 RX ORDER — TADALAFIL 20 MG/1
TABLET ORAL
Qty: 10 TABLET | Refills: 0 | Status: SHIPPED | OUTPATIENT
Start: 2020-09-08 | End: 2020-09-30

## 2020-09-08 NOTE — TELEPHONE ENCOUNTER
Jun Dove called requesting a refill on the following medications:  Requested Prescriptions     Pending Prescriptions Disp Refills    tadalafil (CIALIS) 20 MG tablet [Pharmacy Med Name: TADALAFIL 20 MG TABLET] 10 tablet 0     Sig: TAKE ONE TABLET BY MOUTH AS NEEDED FOR ERECTILE DYSFUNCTION. DO NOT TAKE MORE FREQUENTLY THAN EVERY 48 HOURS.      Pharmacy verified:  .[email or web address]      Date of last visit: 03/14/19  Date of next visit (if applicable): Visit date not found

## 2020-09-14 LAB — PROSTATE SPECIFIC ANTIGEN: NORMAL

## 2020-09-16 ENCOUNTER — OFFICE VISIT (OUTPATIENT)
Dept: UROLOGY | Age: 65
End: 2020-09-16
Payer: MEDICARE

## 2020-09-16 VITALS — HEIGHT: 68 IN | WEIGHT: 178 LBS | TEMPERATURE: 97.9 F | BODY MASS INDEX: 26.98 KG/M2

## 2020-09-16 LAB
BILIRUBIN, POC: NORMAL
BLOOD URINE, POC: NORMAL
CLARITY, POC: CLEAR
COLOR, POC: YELLOW
GLUCOSE URINE, POC: NORMAL
KETONES, POC: NORMAL
LEUKOCYTE EST, POC: NORMAL
NITRITE, POC: NORMAL
PH, POC: 6
PROTEIN, POC: NORMAL
SPECIFIC GRAVITY, POC: 1.01
UROBILINOGEN, POC: 0.2

## 2020-09-16 PROCEDURE — 81003 URINALYSIS AUTO W/O SCOPE: CPT | Performed by: NURSE PRACTITIONER

## 2020-09-16 PROCEDURE — 99213 OFFICE O/P EST LOW 20 MIN: CPT | Performed by: NURSE PRACTITIONER

## 2020-09-16 RX ORDER — TRAZODONE HYDROCHLORIDE 50 MG/1
TABLET ORAL
COMMUNITY
Start: 2020-07-08 | End: 2021-08-03

## 2020-09-16 ASSESSMENT — ENCOUNTER SYMPTOMS
BACK PAIN: 0
ABDOMINAL DISTENTION: 0

## 2020-09-16 NOTE — PROGRESS NOTES
change. Gastrointestinal: Negative for abdominal distention. Genitourinary: Negative for decreased urine volume, difficulty urinating, dysuria, flank pain, frequency, hematuria and urgency. Musculoskeletal: Negative for back pain. Objective:   Temp 97.9 °F (36.6 °C)   Ht 5' 8\" (1.727 m)   Wt 178 lb (80.7 kg)   BMI 27.06 kg/m²     Physical Exam  Constitutional:       General: He is not in acute distress. Appearance: Normal appearance. He is not ill-appearing or diaphoretic. HENT:      Head: Normocephalic and atraumatic. Nose: Nose normal.      Mouth/Throat:      Mouth: Mucous membranes are moist.   Eyes:      General: No scleral icterus. Right eye: No discharge. Left eye: No discharge. Cardiovascular:      Pulses: Normal pulses. Heart sounds: Normal heart sounds. Pulmonary:      Effort: Pulmonary effort is normal. No respiratory distress. Breath sounds: Normal breath sounds. Abdominal:      Tenderness: There is no abdominal tenderness. There is no right CVA tenderness or left CVA tenderness. Musculoskeletal: Normal range of motion. Skin:     General: Skin is warm and dry. Neurological:      Mental Status: He is alert and oriented to person, place, and time. Mental status is at baseline. Psychiatric:         Mood and Affect: Mood normal.         Behavior: Behavior normal.         Thought Content: Thought content normal.         POC  No results found for this visit on 09/16/20. Patients recent PSA values are as follows  Lab Results   Component Value Date    PSA  09/14/2020      Comment:      <0.03    PSA  03/11/2020      Comment:      <0.03    PSA  03/13/2019      Comment:      <0.03        Recent BUN/Creatinine:  Lab Results   Component Value Date    BUN 17 03/13/2019    CREATININE 0.9 03/13/2019       Assessment:   Prostate cancer  Erectile dysfunction    Plan:     Pt's PSA <0.03 showing excellent control of prostate cancer. Continue Cialis. Call if any issues or ineffective. F/u in 1 year with PSA a few days prior.

## 2021-07-28 ENCOUNTER — TELEPHONE (OUTPATIENT)
Dept: CARDIOLOGY CLINIC | Age: 66
End: 2021-07-28

## 2021-08-03 ENCOUNTER — HOSPITAL ENCOUNTER (OUTPATIENT)
Age: 66
Discharge: HOME | End: 2021-08-03
Payer: MEDICARE

## 2021-08-03 ENCOUNTER — OFFICE VISIT (OUTPATIENT)
Dept: CARDIOLOGY CLINIC | Age: 66
End: 2021-08-03
Payer: MEDICARE

## 2021-08-03 VITALS
HEIGHT: 68 IN | HEART RATE: 68 BPM | WEIGHT: 178.6 LBS | DIASTOLIC BLOOD PRESSURE: 80 MMHG | SYSTOLIC BLOOD PRESSURE: 150 MMHG | BODY MASS INDEX: 27.07 KG/M2

## 2021-08-03 DIAGNOSIS — I10 ESSENTIAL HYPERTENSION: ICD-10-CM

## 2021-08-03 DIAGNOSIS — R00.1 BRADYCARDIA: ICD-10-CM

## 2021-08-03 DIAGNOSIS — R00.1 BRADYCARDIA: Primary | ICD-10-CM

## 2021-08-03 DIAGNOSIS — R01.1 HEART MURMUR: ICD-10-CM

## 2021-08-03 LAB — TSH SERPL DL<=0.05 MIU/L-ACNC: 1.53 UIU/ML (ref 0.4–4.2)

## 2021-08-03 PROCEDURE — 4040F PNEUMOC VAC/ADMIN/RCVD: CPT | Performed by: NUCLEAR MEDICINE

## 2021-08-03 PROCEDURE — 1123F ACP DISCUSS/DSCN MKR DOCD: CPT | Performed by: NUCLEAR MEDICINE

## 2021-08-03 PROCEDURE — 3017F COLORECTAL CA SCREEN DOC REV: CPT | Performed by: NUCLEAR MEDICINE

## 2021-08-03 PROCEDURE — 84443 ASSAY THYROID STIM HORMONE: CPT

## 2021-08-03 PROCEDURE — G8427 DOCREV CUR MEDS BY ELIG CLIN: HCPCS | Performed by: NUCLEAR MEDICINE

## 2021-08-03 PROCEDURE — G8417 CALC BMI ABV UP PARAM F/U: HCPCS | Performed by: NUCLEAR MEDICINE

## 2021-08-03 PROCEDURE — 1036F TOBACCO NON-USER: CPT | Performed by: NUCLEAR MEDICINE

## 2021-08-03 PROCEDURE — 36415 COLL VENOUS BLD VENIPUNCTURE: CPT

## 2021-08-03 PROCEDURE — 99204 OFFICE O/P NEW MOD 45 MIN: CPT | Performed by: NUCLEAR MEDICINE

## 2021-08-03 RX ORDER — ESCITALOPRAM OXALATE 20 MG/1
20 TABLET ORAL DAILY
COMMUNITY

## 2021-08-03 ASSESSMENT — ENCOUNTER SYMPTOMS
PHOTOPHOBIA: 0
SHORTNESS OF BREATH: 0
BLOOD IN STOOL: 0
NAUSEA: 0
ANAL BLEEDING: 0
CHEST TIGHTNESS: 0
DIARRHEA: 0
FACIAL SWELLING: 0
CONSTIPATION: 0
VOMITING: 0
ABDOMINAL PAIN: 0
ABDOMINAL DISTENTION: 0
RECTAL PAIN: 0
COLOR CHANGE: 0
BACK PAIN: 0

## 2021-08-03 NOTE — PROGRESS NOTES
[address]  [facility] [address]  Dept: [phone]  Dept Fax: [phone]  Loc: [phone]    Visit Date: 8/3/2021    Presley Hammer is a 77 y.o. male who presents todayfor:  Chief Complaint   Patient presents with    New Patient    Hypertension    Bradycardia     Here for the first time  Was told he has a low HR   ?? Junctional was read on the ECG done in PCP office  No known CAD before  No dizziness  No syncope  He active   Some fatigue with some low energy   Some swelling in the legs  No changes in breathing  Does have no thyroid issues  No known HTN   Higher BP today   No smoking   Family history of CAD     HPI:  HPI  Past Medical History:   Diagnosis Date    Cancer [facility])     prostate      Past Surgical History:   Procedure Laterality Date    COLONOSCOPY      OTHER SURGICAL HISTORY  2018    Staples in head from a fall    PROSTATECTOMY  2017    robotic    TYMPANOPLASTY Right 1985     Family History   Problem Relation Age of Onset    Prostate Cancer Father     Cancer Father         prostate    Breast Cancer Mother     Diabetes Paternal Aunt     Heart Disease Neg Hx     High Blood Pressure Neg Hx     Stroke Neg Hx      Social History     Tobacco Use    Smoking status: Former Smoker     Quit date:      Years since quittin.6    Smokeless tobacco: Never Used   Substance Use Topics    Alcohol use: Yes     Comment: 3 days per week 3-4 shots      Current Outpatient Medications   Medication Sig Dispense Refill    escitalopram (LEXAPRO) 20 MG tablet Take 20 mg by mouth daily      NONFORMULARY Anxiety and stress relief      tadalafil (CIALIS) 20 MG tablet TAKE ONE TABLET BY MOUTH AS NEEDED FOR ERECTILE DYSFUNCTION. DO NOT TAKE MORE FREQUENTLY THAN EVERY 48 HOURS 10 tablet 1    FOLIC ACID PO Take by mouth       No current facility-administered medications for this visit.      No Known Allergies  Health Maintenance   Topic Date Due    Hepatitis C screen  Never done    DTaP/Tdap/Td vaccine (1 - Tdap) Never done    Diabetes screen  Never done    Colon cancer screen colonoscopy  Never done    Pneumococcal 65+ years Vaccine (2 of 2 - PPSV23) 03/26/2020    Annual Wellness Visit (AWV)  Never done    Flu vaccine (1) 09/01/2021    PSA counseling  09/14/2021    Lipid screen  03/13/2024    Shingles Vaccine  Completed    COVID-19 Vaccine  Completed    AAA screen  Completed    Hepatitis A vaccine  Aged Out    Hepatitis B vaccine  Aged Out    Hib vaccine  Aged Out    Meningococcal (ACWY) vaccine  Aged Out       Subjective:  Review of Systems   Constitutional: Positive for fatigue. HENT: Negative for facial swelling and nosebleeds. Eyes: Negative for photophobia. Respiratory: Negative for chest tightness and shortness of breath. Cardiovascular: Negative for chest pain, palpitations and leg swelling. Gastrointestinal: Negative for abdominal distention, abdominal pain, anal bleeding, blood in stool, constipation, diarrhea, nausea, rectal pain and vomiting. Endocrine: Negative for polyphagia. Genitourinary: Negative for dysuria, frequency and urgency. Musculoskeletal: Negative for arthralgias, back pain, gait problem, joint swelling, myalgias, neck pain and neck stiffness. Skin: Negative for color change, pallor, rash and wound. Allergic/Immunologic: Negative for food allergies. Neurological: Negative for dizziness, syncope and light-headedness. Psychiatric/Behavioral: Negative for decreased concentration, dysphoric mood, hallucinations and self-injury. The patient is not nervous/anxious and is not hyperactive. Objective:  Physical Exam  HENT:      Head: Normocephalic. Nose: Nose normal.      Mouth/Throat:      Mouth: Mucous membranes are moist.   Eyes:      Pupils: Pupils are equal, round, and reactive to light.    Cardiovascular:      Rate and Rhythm: Normal rate and regular rhythm. Heart sounds: Murmur heard. No gallop. Pulmonary:      Effort: No respiratory distress. Breath sounds: No stridor. No wheezing, rhonchi or rales. Chest:      Chest wall: No tenderness. Abdominal:      General: There is no distension. Palpations: There is no mass. Tenderness: There is no abdominal tenderness. There is no right CVA tenderness, left CVA tenderness, guarding or rebound. Hernia: No hernia is present. Musculoskeletal:         General: No swelling, tenderness, deformity or signs of injury. Cervical back: Normal range of motion. Right lower leg: No edema. Left lower leg: No edema. Skin:     Coloration: Skin is not jaundiced or pale. Findings: No bruising, erythema, lesion or rash. Neurological:      Mental Status: He is alert and oriented to person, place, and time. Cranial Nerves: No cranial nerve deficit. Sensory: No sensory deficit. Motor: No weakness. Coordination: Coordination normal.      Gait: Gait normal.      Deep Tendon Reflexes: Reflexes normal.   Psychiatric:         Mood and Affect: Mood normal.         Thought Content: Thought content normal.       BP (!) 150/80   Pulse 68   Ht 5' 8\" (1.727 m)   Wt 178 lb 9.6 oz (81 kg)   BMI 27.16 kg/m²     Assessment:      Diagnosis Orders   1. Bradycardia     2. Essential hypertension     ECg reviewed  No junctional rhythm   Sinus bradycardia  Lower risk for CAd  No known thyroid issues   Plan:  No follow-ups on file. Consider a holter and echo   Vs observation   Check thyroid  Decide after that   Currently no indication for a pacemaker yet   Continue risk factor modification and medical management  Thank you for allowing me to participate in the care of your patient.  Please don't hesitate to contact me regarding any further issues related to the patient care    Orders Placed:  No orders of the defined types were placed in this encounter. Medications Prescribed:  No orders of the defined types were placed in this encounter. Discussed use, benefit, and side effects of prescribed medications. All patient questions answered. Pt voicedunderstanding. Instructed to continue current medications, diet and exercise. Continue risk factor modification and medical management. Patient agreed with treatment plan. Follow up as directed.     Electronically signedby Kacy Kee MD on 8/3/2021 at 8:36 AM

## 2021-08-23 DIAGNOSIS — R01.1 HEART MURMUR: ICD-10-CM

## 2021-08-23 DIAGNOSIS — I10 ESSENTIAL HYPERTENSION: ICD-10-CM

## 2021-08-23 DIAGNOSIS — R00.1 BRADYCARDIA: ICD-10-CM

## 2021-09-02 ENCOUNTER — TELEPHONE (OUTPATIENT)
Dept: CARDIOLOGY CLINIC | Age: 66
End: 2021-09-02

## 2021-09-02 DIAGNOSIS — R00.1 BRADYCARDIA: Primary | ICD-10-CM

## 2021-09-02 NOTE — TELEPHONE ENCOUNTER
Pt asking about results of testing, HR was 40s pt is still having light headed and fatigued, asking recommendations?

## 2021-09-07 NOTE — TELEPHONE ENCOUNTER
Pt called, states someone had called friday to schedule event monitor at [facility] but pt was not able to write date and time down    Call to [facility], 30 day event scheduled 09-08-21 @ 11:00am  Pt informed

## 2021-09-08 ENCOUNTER — TELEPHONE (OUTPATIENT)
Dept: UROLOGY | Age: 66
End: 2021-09-08

## 2021-09-08 NOTE — TELEPHONE ENCOUNTER
Patient returning a call about possibly changing his 9/15/2021 appt with Burgess Geiger to ? And he said he still needs to have his PSA done and is asking for an order to be faxed to [facility].

## 2021-09-09 ENCOUNTER — TELEPHONE (OUTPATIENT)
Dept: UROLOGY | Age: 66
End: 2021-09-09

## 2021-09-14 LAB — PROSTATE SPECIFIC ANTIGEN: NORMAL

## 2021-09-15 ENCOUNTER — VIRTUAL VISIT (OUTPATIENT)
Dept: UROLOGY | Age: 66
End: 2021-09-15
Payer: MEDICARE

## 2021-09-15 ENCOUNTER — TELEPHONE (OUTPATIENT)
Dept: UROLOGY | Age: 66
End: 2021-09-15

## 2021-09-15 DIAGNOSIS — C61 PROSTATE CANCER (HCC): Primary | ICD-10-CM

## 2021-09-15 PROCEDURE — 99441 PR PHYS/QHP TELEPHONE EVALUATION 5-10 MIN: CPT | Performed by: NURSE PRACTITIONER

## 2021-09-15 ASSESSMENT — ENCOUNTER SYMPTOMS
VOMITING: 0
BACK PAIN: 0
NAUSEA: 0
ABDOMINAL PAIN: 0

## 2021-09-15 NOTE — TELEPHONE ENCOUNTER
----- Message from [facility] JOHN SUBRAMANIAN - CNP sent at 9/15/2021  4:35 PM EDT -----  Please let pt know his PSA is stable at <0.03.

## 2021-09-15 NOTE — PROGRESS NOTES
Emigdoi Mendez is a 77 y.o. male evaluated via telephone on 9/15/2021. Consent:  He and/or health care decision maker is aware that that he may receive a bill for this telephone service, depending on his insurance coverage, and has provided verbal consent to proceed: Yes    Documentation:  I communicated with the patient and/or health care decision maker about prostate cancer. Details of this discussion including any medical advice provided:         [address] [place] [address]  Dept: [phone]  Loc: [phone]    Visit Date: 9/15/2021        HPI:     Emigdio Mendez is a 77 y.o. male who presents today for:  Chief Complaint   Patient presents with    Prostate Cancer       HPI   Pt seen today in follow up for prostate cancer. Pt underwent RALRP 7/2017 with pathology showing [place] 3+4=7 prostate cancer pT2cN0. PSA 9/14/2020 <0.03.       Pt has ED following prostatectomy and has tried generic viagra in the past.  Most recently he was prescribed Cialis.        Denies any issues with urination, dysuria, new or unexpected back/hip/pelvis pain. Gained some weight over the last year (10 lbs) but not necessarily doing anything to prevent it. Notes undergoing workup by PCP and cardio regarding lower bps and HR and fatigue. One week ago was bucked off a horse and broke his clavicle. Current Outpatient Medications   Medication Sig Dispense Refill    escitalopram (LEXAPRO) 20 MG tablet Take 20 mg by mouth daily      NONFORMULARY Anxiety and stress relief      tadalafil (CIALIS) 20 MG tablet TAKE ONE TABLET BY MOUTH AS NEEDED FOR ERECTILE DYSFUNCTION. DO NOT TAKE MORE FREQUENTLY THAN EVERY 48 HOURS 10 tablet 1    FOLIC ACID PO Take by mouth       No current facility-administered medications for this visit. Past Medical History  Kelli Perez  has a past medical history of Cancer ([facility] [address]     Past Surgical History  The patient  has a past surgical history that includes Tympanoplasty (Right, 1985); Colonoscopy; Prostatectomy (07/14/2017); and other surgical history (07/19/2018). Family History  This patient's family history includes Breast Cancer in his mother; Cancer in his father; Diabetes in his paternal aunt; Prostate Cancer in his father. Social History  Leidy Quiñonez  reports that he quit smoking about 34 years ago. He has never used smokeless tobacco. He reports current alcohol use. He reports that he does not use drugs. Subjective:      Review of Systems   Constitutional: Negative for activity change, appetite change, chills, diaphoresis, fatigue, fever and unexpected weight change. Gastrointestinal: Negative for abdominal pain, nausea and vomiting. Genitourinary: Negative for decreased urine volume, difficulty urinating, dysuria, flank pain, frequency, hematuria and urgency. Musculoskeletal: Negative for back pain. Objective: There were no vitals taken for this visit. Physical Exam  Unable to perform PE as visit completed via telephone. POC  No results found for this visit on 09/15/21. Patients recent PSA values are as follows  Lab Results   Component Value Date    PSA  09/14/2020      Comment:      <0.03    PSA  03/11/2020      Comment:      <0.03    PSA  03/13/2019      Comment:      <0.03        Recent BUN/Creatinine:  Lab Results   Component Value Date    BUN 17 03/13/2019    CREATININE 0.9 03/13/2019         Assessment:   Prostate cancer  Erectile dysfunction    Plan:     Obtain PSA result from [facility] and call pt with results. As long as PSA is stable with repeat PSA in one year with appt to review results. I affirm this is a Patient Initiated Episode with a Patient who has not had a related appointment within my department in the past 7 days or scheduled within the next 24 hours.     Patient identification was verified at the start of the visit: Yes    Total Time: minutes: 5-10 minutes    The visit was conducted pursuant to the emergency declaration under the [facility], [facility] authority and the [employer] Act. Patient identification was verified, and a caregiver was present when appropriate. The patient was located in a state where the provider was credentialed to provide care.     Note: not billable if this call serves to triage the patient into an appointment for the relevant concern      JOHN Schmitt - CNP

## 2021-10-12 DIAGNOSIS — R00.1 BRADYCARDIA: ICD-10-CM

## 2022-03-23 DIAGNOSIS — C61 PROSTATE CANCER (HCC): Primary | ICD-10-CM

## 2022-09-19 LAB
PROSTATE SPECIFIC ANTIGEN: NORMAL
TESTOSTERONE FREE: 91
TESTOSTERONE TOTAL: 654

## 2022-09-21 ENCOUNTER — OFFICE VISIT (OUTPATIENT)
Dept: UROLOGY | Age: 67
End: 2022-09-21
Payer: MEDICARE

## 2022-09-21 VITALS
HEIGHT: 68 IN | SYSTOLIC BLOOD PRESSURE: 124 MMHG | WEIGHT: 178 LBS | BODY MASS INDEX: 26.98 KG/M2 | DIASTOLIC BLOOD PRESSURE: 76 MMHG

## 2022-09-21 DIAGNOSIS — C61 PROSTATE CANCER (HCC): Primary | ICD-10-CM

## 2022-09-21 PROCEDURE — G8417 CALC BMI ABV UP PARAM F/U: HCPCS | Performed by: NURSE PRACTITIONER

## 2022-09-21 PROCEDURE — G8427 DOCREV CUR MEDS BY ELIG CLIN: HCPCS | Performed by: NURSE PRACTITIONER

## 2022-09-21 PROCEDURE — 1036F TOBACCO NON-USER: CPT | Performed by: NURSE PRACTITIONER

## 2022-09-21 PROCEDURE — 1123F ACP DISCUSS/DSCN MKR DOCD: CPT | Performed by: NURSE PRACTITIONER

## 2022-09-21 PROCEDURE — 3017F COLORECTAL CA SCREEN DOC REV: CPT | Performed by: NURSE PRACTITIONER

## 2022-09-21 PROCEDURE — 99213 OFFICE O/P EST LOW 20 MIN: CPT | Performed by: NURSE PRACTITIONER

## 2022-09-21 ASSESSMENT — ENCOUNTER SYMPTOMS
BACK PAIN: 0
ABDOMINAL PAIN: 0
VOMITING: 0
NAUSEA: 0

## 2022-09-21 NOTE — PROGRESS NOTES
flat       POC  No results found for this visit on 09/21/22. Patients recent PSA values are as follows  Lab Results   Component Value Date    PSA  09/14/2021      Comment:      <0.03    PSA  09/14/2020      Comment:      <0.03    PSA  03/11/2020      Comment:      <0.03        Recent BUN/Creatinine:  Lab Results   Component Value Date/Time    BUN 17 03/13/2019 12:00 AM    CREATININE 0.9 03/13/2019 12:00 AM       Assessment:   Prostate cancer  Erectile dysfunction  Plan:     Pt's PSA continues to be less than .03 showing excellent control of prostate cancer at this time. Pt reports some recent decrease in energy. Is undergoing workup by pcp. Recent testosterone level normal.  Recommend continued follow up with PCP. F/u in 1 year with PSA 1 week prior to appt.

## 2022-10-11 ENCOUNTER — OFFICE VISIT (OUTPATIENT)
Dept: CARDIOLOGY CLINIC | Age: 67
End: 2022-10-11
Payer: MEDICARE

## 2022-10-11 VITALS
HEIGHT: 68 IN | HEART RATE: 52 BPM | DIASTOLIC BLOOD PRESSURE: 86 MMHG | WEIGHT: 177.8 LBS | BODY MASS INDEX: 26.95 KG/M2 | SYSTOLIC BLOOD PRESSURE: 142 MMHG

## 2022-10-11 DIAGNOSIS — R00.1 BRADYCARDIA: Primary | ICD-10-CM

## 2022-10-11 PROCEDURE — G8427 DOCREV CUR MEDS BY ELIG CLIN: HCPCS | Performed by: NUCLEAR MEDICINE

## 2022-10-11 PROCEDURE — G8484 FLU IMMUNIZE NO ADMIN: HCPCS | Performed by: NUCLEAR MEDICINE

## 2022-10-11 PROCEDURE — G8417 CALC BMI ABV UP PARAM F/U: HCPCS | Performed by: NUCLEAR MEDICINE

## 2022-10-11 PROCEDURE — 3017F COLORECTAL CA SCREEN DOC REV: CPT | Performed by: NUCLEAR MEDICINE

## 2022-10-11 PROCEDURE — 1123F ACP DISCUSS/DSCN MKR DOCD: CPT | Performed by: NUCLEAR MEDICINE

## 2022-10-11 PROCEDURE — 99213 OFFICE O/P EST LOW 20 MIN: CPT | Performed by: NUCLEAR MEDICINE

## 2022-10-11 PROCEDURE — 1036F TOBACCO NON-USER: CPT | Performed by: NUCLEAR MEDICINE

## 2022-10-11 RX ORDER — ACETAMINOPHEN 500 MG
TABLET ORAL
COMMUNITY

## 2022-10-11 NOTE — PROGRESS NOTES
[address] [place] [address]. [address] [facility] [address] [facility]  Dept: [phone]  Dept Fax: [phone]  Loc: [phone]    Visit Date: 10/11/2022    Corinne Esters is a 79 y.o. male who presents todayfor:  Chief Complaint   Patient presents with    Follow-up    Bradycardia   Seen for bradycardia  Event monitor and echo were okay  Does have fatigue   Possible sleep apnea  No chest pain   Some baseline dyspnea  BP is not followed         HPI:  HPI  Past Medical History:   Diagnosis Date    Cancer [facility])     prostate      Past Surgical History:   Procedure Laterality Date    COLONOSCOPY      OTHER SURGICAL HISTORY  2018    Staples in head from a fall    PROSTATECTOMY  2017    robotic    TYMPANOPLASTY Right 1985     Family History   Problem Relation Age of Onset    Prostate Cancer Father     Cancer Father         prostate    Breast Cancer Mother     Diabetes Paternal Aunt     Heart Disease Neg Hx     High Blood Pressure Neg Hx     Stroke Neg Hx      Social History     Tobacco Use    Smoking status: Former     Types: Cigarettes     Quit date:      Years since quittin.8    Smokeless tobacco: Never   Substance Use Topics    Alcohol use: Yes     Comment: 3 days per week 3-4 shots      Current Outpatient Medications   Medication Sig Dispense Refill    acetaminophen (TYLENOL) 500 MG tablet Take by mouth      escitalopram (LEXAPRO) 20 MG tablet Take 20 mg by mouth daily      NONFORMULARY Anxiety and stress relief      FOLIC ACID PO Take by mouth       No current facility-administered medications for this visit.      No Known Allergies  Health Maintenance   Topic Date Due    COVID-19 Vaccine (1) Never done    Depression Screen  Never done    Hepatitis C screen  Never done    DTaP/Tdap/Td vaccine (1 - Tdap) Never done    Diabetes screen  Never done    Colorectal Cancer Screen  Never done    AAA screen  2020    Annual Wellness Visit (AWV)  Never Patient is here today to follow up on:  Hypertension.  No chest pain or headaches  DJD. Spine. Aching pain, but has been improved lately. Use flexeril occaiosnally  Hyperlipidemia. Trying to follow diet, not on medication at this time. No COLLAZO  DM. No blurred vision or urinary frequency.     The patient denies nausea, vomiting, fever, shortness of breath, chest pain, or dizziness    It should be noted that the patient's list of drug allergies/sensitivities, current medication list, problem list, and past medical history were reviewed today by me with the patient and in [facility], and updated accordingly.      EXAMINATION:  The patient appears comfortable, nontoxic, and in no acute distress.  Lungs are clear to auscultation without rales, wheezes, or rhonchi.  Chest is negative to percussion.  Respiratory effort appears normal and without splinting.  Cardiac examination reveals a regular rate and rhythm without S3, S4, heave, or thrill.  There is no significant pedal edema.      A/P:  HTN. Good control. Follow without medication  Lipids. Diet discussed. REFUSES another statin. Trial Zetia.   DM. Doing well. Check labs    REFUSES flu shot; importance discussed    Patient will see me again in the office in 5 months, or sooner PRN      The following diagnoses have been reviewed with the patient at today's visit and appropriate recommendations made:    E11.9 Type 2 diabetes mellitus without complication, without long-term current use of insulin (CMS/[facility])  (primary encounter diagnosis)  Z23 Need for vaccination  M79.2 Neuropathic pain  G95.89 Myelomalacia of cervical cord (CMS/HCC)  B00.9 HSV-2 (herpes simplex virus 2) infection  E78.2 Mixed hyperlipidemia         done    Pneumococcal 65+ years Vaccine (2 - PPSV23 or PCV20) 11/18/2021    Flu vaccine (1) 08/01/2022    Prostate Specific Antigen (PSA) Screening or Monitoring  09/14/2022    Lipids  03/13/2024    Shingles vaccine  Completed    Hepatitis A vaccine  Aged Out    Hib vaccine  Aged Out    Meningococcal (ACWY) vaccine  Aged Out       Subjective:  Review of Systems  General:   No fever, no chills, some fatigue or weight loss  Pulmonary:    some dyspnea, no wheezing  Cardiac:    Denies recent chest pain,   GI:     No nausea or vomiting, no abdominal pain  Neuro:    No dizziness or light headedness,   Musculoskeletal:  No recent active issues  Extremities:   No edema, no obvious claudication     Objective:  Physical Exam  BP (!) 142/86   Pulse 52   Ht 5' 8\" (1.727 m)   Wt 177 lb 12.8 oz (80.6 kg)   BMI 27.03 kg/m²   General:   Well developed, well nourished  Lungs:   Clear to auscultation  Heart:    Normal S1 S2, Slight murmur. no rubs, no gallops  Abdomen:   Soft, non tender, no organomegalies, positive bowel sounds  Extremities:   No edema, no cyanosis, good peripheral pulses  Neurological:   Awake, alert, oriented. No obvious focal deficits  Musculoskelatal:  No obvious deformities    Assessment:      Diagnosis Orders   1. Bradycardia        As above  Cardiac seems stable   I suspect sleep apnea   Plan:  No follow-ups on file. Discussed  Consider sleep study   Continue risk factor modification and medical management  Thank you for allowing me to participate in the care of your patient. Please don't hesitate to contact me regarding any further issues related to the patient care    Orders Placed:  No orders of the defined types were placed in this encounter. Medications Prescribed:  No orders of the defined types were placed in this encounter. Discussed use, benefit, and side effects of prescribed medications. All patient questions answered. Pt voicedunderstanding.  Instructed to continue current medications, diet and exercise. Continue risk factor modification and medical management. Patient agreed with treatment plan. Follow up as directed.     Electronically signedby Joanna Perry MD on 10/11/2022 at 12:59 PM

## 2023-01-13 ENCOUNTER — HOSPITAL ENCOUNTER (OUTPATIENT)
Dept: MRI IMAGING | Age: 68
Discharge: HOME | End: 2023-01-13
Payer: MEDICARE

## 2023-01-13 DIAGNOSIS — R41.3 MEMORY LOSS: ICD-10-CM

## 2023-01-13 DIAGNOSIS — M54.50 LOW BACK PAIN, UNSPECIFIED BACK PAIN LATERALITY, UNSPECIFIED CHRONICITY, UNSPECIFIED WHETHER SCIATICA PRESENT: ICD-10-CM

## 2023-01-13 PROCEDURE — 70551 MRI BRAIN STEM W/O DYE: CPT

## 2023-01-13 PROCEDURE — 72148 MRI LUMBAR SPINE W/O DYE: CPT

## 2023-06-02 ENCOUNTER — TELEPHONE (OUTPATIENT)
Dept: NEUROLOGY | Age: 68
End: 2023-06-02

## 2023-06-02 DIAGNOSIS — R25.8 BRADYKINESIA: ICD-10-CM

## 2023-06-02 DIAGNOSIS — G20 PARKINSON'S DISEASE (HCC): Primary | ICD-10-CM

## 2023-06-02 DIAGNOSIS — R49.8 HYPOPHONIA: ICD-10-CM

## 2023-06-02 NOTE — TELEPHONE ENCOUNTER
[address] therapy called stating they received PT order. They are requesting order for Speech therapy as well per patient request, for the big and loud program. Please advise. Thank you.      Fax [phone]

## 2023-06-27 ENCOUNTER — OFFICE VISIT (OUTPATIENT)
Dept: NEUROLOGY | Age: 68
End: 2023-06-27
Payer: MEDICARE

## 2023-06-27 VITALS
SYSTOLIC BLOOD PRESSURE: 120 MMHG | WEIGHT: 180 LBS | DIASTOLIC BLOOD PRESSURE: 80 MMHG | BODY MASS INDEX: 26.66 KG/M2 | OXYGEN SATURATION: 95 % | HEART RATE: 52 BPM | HEIGHT: 69 IN

## 2023-06-27 DIAGNOSIS — G20 PARKINSON'S DISEASE (HCC): Primary | ICD-10-CM

## 2023-06-27 DIAGNOSIS — R49.8 HYPOPHONIA: ICD-10-CM

## 2023-06-27 DIAGNOSIS — R25.8 BRADYKINESIA: ICD-10-CM

## 2023-06-27 PROCEDURE — 3017F COLORECTAL CA SCREEN DOC REV: CPT | Performed by: NURSE PRACTITIONER

## 2023-06-27 PROCEDURE — 99213 OFFICE O/P EST LOW 20 MIN: CPT | Performed by: NURSE PRACTITIONER

## 2023-06-27 PROCEDURE — G8417 CALC BMI ABV UP PARAM F/U: HCPCS | Performed by: NURSE PRACTITIONER

## 2023-06-27 PROCEDURE — 1123F ACP DISCUSS/DSCN MKR DOCD: CPT | Performed by: NURSE PRACTITIONER

## 2023-06-27 PROCEDURE — G8427 DOCREV CUR MEDS BY ELIG CLIN: HCPCS | Performed by: NURSE PRACTITIONER

## 2023-06-27 PROCEDURE — 1036F TOBACCO NON-USER: CPT | Performed by: NURSE PRACTITIONER

## 2023-07-24 ENCOUNTER — TELEPHONE (OUTPATIENT)
Dept: NEUROLOGY | Age: 68
End: 2023-07-24

## 2023-07-24 NOTE — TELEPHONE ENCOUNTER
Returned call to [facility] and gave verbal okay for new dx code to be added. Verbalized understanding with no additional questions at this time.

## 2023-07-24 NOTE — TELEPHONE ENCOUNTER
----- Message from JOHN Radford CNP sent at 7/24/2023 10:18 AM EDT -----  Regarding: RE: dx code  ok  ----- Message -----  From: Cherie Goodwin MA  Sent: 7/24/2023   9:34 AM EDT  To: JOHN Radford CNP  Subject: dx code                                            Leola Encarnacion from [facility] in [facility] [phone]. The patient's insurance is not wanting to cover rehab with the dx codes provided. 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    Thanks  [address] [place] [phone]

## 2023-09-13 ENCOUNTER — OFFICE VISIT (OUTPATIENT)
Dept: NEUROLOGY | Age: 68
End: 2023-09-13
Payer: MEDICARE

## 2023-09-13 VITALS
HEART RATE: 49 BPM | OXYGEN SATURATION: 98 % | BODY MASS INDEX: 25.48 KG/M2 | WEIGHT: 172 LBS | HEIGHT: 69 IN | SYSTOLIC BLOOD PRESSURE: 105 MMHG | DIASTOLIC BLOOD PRESSURE: 70 MMHG

## 2023-09-13 DIAGNOSIS — R25.8 BRADYKINESIA: ICD-10-CM

## 2023-09-13 DIAGNOSIS — R49.8 HYPOPHONIA: ICD-10-CM

## 2023-09-13 DIAGNOSIS — G20 PARKINSON'S DISEASE (HCC): Primary | ICD-10-CM

## 2023-09-13 PROCEDURE — G8427 DOCREV CUR MEDS BY ELIG CLIN: HCPCS | Performed by: NURSE PRACTITIONER

## 2023-09-13 PROCEDURE — 99213 OFFICE O/P EST LOW 20 MIN: CPT | Performed by: NURSE PRACTITIONER

## 2023-09-13 PROCEDURE — 1036F TOBACCO NON-USER: CPT | Performed by: NURSE PRACTITIONER

## 2023-09-13 PROCEDURE — 3017F COLORECTAL CA SCREEN DOC REV: CPT | Performed by: NURSE PRACTITIONER

## 2023-09-13 PROCEDURE — G8417 CALC BMI ABV UP PARAM F/U: HCPCS | Performed by: NURSE PRACTITIONER

## 2023-09-13 PROCEDURE — 1123F ACP DISCUSS/DSCN MKR DOCD: CPT | Performed by: NURSE PRACTITIONER

## 2023-09-13 NOTE — PATIENT INSTRUCTIONS
Change Sinemet to 25/100 mg to 1.5 tablets three times a day, take every 5 hours while awake, Take at 7am, 12pm, 5pm daily  Follow through with physical therapy recommendations  Follow through with speech therapy recommendations  You need to stay physically active  Call with any new symptoms or concerns.    Follow up in 4 months

## 2023-09-13 NOTE — PROGRESS NOTES
NEUROLOGY OUT PATIENT FOLLOW UP NOTE:  9/13/202310:14 AM    Rowan hSanks is here for follow up for parkinson's disease           No Known Allergies    Current Outpatient Medications:     METAMUCIL FIBER PO, Take by mouth, Disp: , Rfl:     GENERIC EXTERNAL MEDICATION, Liposomal glutathone, Disp: , Rfl:     carbidopa-levodopa (SINEMET)  MG per tablet, Take 1 tablet by mouth 3 times daily Take at 7 am, 12pm, and 5 pm daily, Disp: 90 tablet, Rfl: 3    allopurinol (ZYLOPRIM) 300 MG tablet, Take 1 tablet by mouth daily, Disp: , Rfl:     GENERIC EXTERNAL MEDICATION, Recuperate IQ 2 capsules daily, Disp: , Rfl:     Cholecalciferol (VITAMIN D3) 125 MCG (5000 UT) TABS, Take by mouth daily, Disp: , Rfl:     GENERIC EXTERNAL MEDICATION, Curamin Pain Relief 4 capsules daily, Disp: , Rfl:     IODINE-POTASSIUM IODIDE PO, Take by mouth Iodoral IOD 12.5 -- 1 tablet daily, Disp: , Rfl:     Vitamin A 2250 MCG (7500 UT) CAPS, Take by mouth daily, Disp: , Rfl:     NONFORMULARY, Fulvic mineral drops 8 drops daily, Disp: , Rfl:     FOLIC ACID PO, Take by mouth, Disp: , Rfl:     I reviewed the past medical history, allergies, medications, social history and family history. PE:   Vitals:    09/13/23 1005   BP: 105/70   Site: Left Upper Arm   Position: Sitting   Cuff Size: Medium Adult   Pulse: (!) 49   SpO2: 98%   Weight: 172 lb (78 kg)   Height: 5' 9\" (1.753 m)     General Appearance:  alert and cooperative  Gen: NAD, Language is Intact. Skin: no rash, lesion, dry  to touch. warm  Head: no rash, no icterus  Neck: There is no carotid bruits. The Neck is supple. Neuro: CN 2-12 grossly intact with no focal deficits. Power 5/5 Throughout symmetric, Reflexes are symmetric. Long tracts are intact. Cerebellar exam is Intact. Sensory exam is intact to light touch.   no resting tremor, no pinrolling +ve  mild bradykinesia,   +ve mild Hypohonia, +ve decreased blink rate, no difficulty exiting chair,  arm swing is intact, he is not

## 2023-09-18 LAB — PROSTATE SPECIFIC ANTIGEN: NORMAL

## 2023-09-20 ENCOUNTER — OFFICE VISIT (OUTPATIENT)
Dept: UROLOGY | Age: 68
End: 2023-09-20
Payer: MEDICARE

## 2023-09-20 VITALS
BODY MASS INDEX: 25.18 KG/M2 | SYSTOLIC BLOOD PRESSURE: 108 MMHG | HEIGHT: 69 IN | WEIGHT: 170 LBS | DIASTOLIC BLOOD PRESSURE: 70 MMHG

## 2023-09-20 DIAGNOSIS — C61 PROSTATE CANCER (HCC): Primary | ICD-10-CM

## 2023-09-20 PROCEDURE — G8417 CALC BMI ABV UP PARAM F/U: HCPCS | Performed by: NURSE PRACTITIONER

## 2023-09-20 PROCEDURE — 3017F COLORECTAL CA SCREEN DOC REV: CPT | Performed by: NURSE PRACTITIONER

## 2023-09-20 PROCEDURE — 1036F TOBACCO NON-USER: CPT | Performed by: NURSE PRACTITIONER

## 2023-09-20 PROCEDURE — 1123F ACP DISCUSS/DSCN MKR DOCD: CPT | Performed by: NURSE PRACTITIONER

## 2023-09-20 PROCEDURE — G8427 DOCREV CUR MEDS BY ELIG CLIN: HCPCS | Performed by: NURSE PRACTITIONER

## 2023-09-20 PROCEDURE — 99213 OFFICE O/P EST LOW 20 MIN: CPT | Performed by: NURSE PRACTITIONER

## 2023-09-20 ASSESSMENT — ENCOUNTER SYMPTOMS
VOMITING: 0
BACK PAIN: 0
NAUSEA: 0
ABDOMINAL PAIN: 0

## 2023-09-20 NOTE — PROGRESS NOTES
[address] [address]  Dept: [phone]  Loc: [phone]    Visit Date: 9/20/2023        HPI:     Moustapha Pizano is a 76 y.o. male who presents today for:  Chief Complaint   Patient presents with    Prostate Cancer       HPI      Pt seen in follow up for prostate cancer. Pt underwent RALRP 7/2017 with pathology showing Alia 3+4=7 prostate cancer pT2cN0. Pt has ED following prostatectomy and has tried generic viagra in the past.  Most recently he was prescribed Cialis. Not currently on any medication. Has some trouble with urinary frequency and wakes 1-2 x per night to urinate. Diagnosed with Parkinsons disease since last appt      Current Outpatient Medications   Medication Sig Dispense Refill    METAMUCIL FIBER PO Take by mouth      GENERIC EXTERNAL MEDICATION Liposomal glutathone      carbidopa-levodopa (SINEMET)  MG per tablet Take 1.5 tablets by mouth 3 times daily 135 tablet 3    allopurinol (ZYLOPRIM) 300 MG tablet Take 1 tablet by mouth daily      GENERIC EXTERNAL MEDICATION Recuperate IQ 2 capsules daily      Cholecalciferol (VITAMIN D3) 125 MCG (5000 UT) TABS Take by mouth daily      GENERIC EXTERNAL MEDICATION Curamin Pain Relief 4 capsules daily      IODINE-POTASSIUM IODIDE PO Take by mouth Iodoral IOD 12.5 -- 1 tablet daily      Vitamin A 2250 MCG (7500 UT) CAPS Take by mouth daily      NONFORMULARY Fulvic mineral drops 8 drops daily      FOLIC ACID PO Take by mouth       No current facility-administered medications for this visit. Past Medical History  Miguel French  has a past medical history of Cancer ([address] ). Past Surgical History  The patient  has a past surgical history that includes Tympanoplasty (Right, 1985); Colonoscopy; Prostatectomy (07/14/2017); other surgical history (07/19/2018); and Cataract removal (Bilateral, 2022).     Family History  This patient's family

## 2024-01-17 NOTE — TELEPHONE ENCOUNTER
Please approve or deny     Last Visit Date:  9/13/2023  Paige Leopold     Next Visit Date:    1/24/2024  Paige Leopold

## 2024-01-24 ENCOUNTER — OFFICE VISIT (OUTPATIENT)
Dept: NEUROLOGY | Age: 69
End: 2024-01-24
Payer: MEDICARE

## 2024-01-24 VITALS
OXYGEN SATURATION: 97 % | SYSTOLIC BLOOD PRESSURE: 121 MMHG | HEART RATE: 54 BPM | HEIGHT: 69 IN | DIASTOLIC BLOOD PRESSURE: 69 MMHG | BODY MASS INDEX: 26.36 KG/M2 | WEIGHT: 178 LBS

## 2024-01-24 DIAGNOSIS — R49.8 HYPOPHONIA: ICD-10-CM

## 2024-01-24 DIAGNOSIS — R25.8 BRADYKINESIA: ICD-10-CM

## 2024-01-24 DIAGNOSIS — G20.A1 PARKINSON'S DISEASE WITHOUT DYSKINESIA OR FLUCTUATING MANIFESTATIONS: Primary | ICD-10-CM

## 2024-01-24 DIAGNOSIS — R53.83 OTHER FATIGUE: ICD-10-CM

## 2024-01-24 PROCEDURE — 1123F ACP DISCUSS/DSCN MKR DOCD: CPT | Performed by: NURSE PRACTITIONER

## 2024-01-24 PROCEDURE — G8427 DOCREV CUR MEDS BY ELIG CLIN: HCPCS | Performed by: NURSE PRACTITIONER

## 2024-01-24 PROCEDURE — 1036F TOBACCO NON-USER: CPT | Performed by: NURSE PRACTITIONER

## 2024-01-24 PROCEDURE — G8484 FLU IMMUNIZE NO ADMIN: HCPCS | Performed by: NURSE PRACTITIONER

## 2024-01-24 PROCEDURE — G8417 CALC BMI ABV UP PARAM F/U: HCPCS | Performed by: NURSE PRACTITIONER

## 2024-01-24 PROCEDURE — 99214 OFFICE O/P EST MOD 30 MIN: CPT | Performed by: NURSE PRACTITIONER

## 2024-01-24 PROCEDURE — 3017F COLORECTAL CA SCREEN DOC REV: CPT | Performed by: NURSE PRACTITIONER

## 2024-01-24 NOTE — PATIENT INSTRUCTIONS
Continue with  Sinemet to 25/100 mg to 1.5 tablets three times a day, take every 5 hours while awake, Take at 7am, 12pm, 5pm daily  Referral to sleep medicine to screen for sleep disorder  Follow through with physical therapy recommendations  Follow through with speech therapy recommendations  You need to stay physically active  Call with any new symptoms or concerns.   Follow up in 4 months

## 2024-01-24 NOTE — PROGRESS NOTES
NEUROLOGY OUT PATIENT FOLLOW UP NOTE:  1/24/202410:18 AM    Hugh Patel is here for follow up for parkinson's disease            No Known Allergies    Current Outpatient Medications:     carbidopa-levodopa (SINEMET)  MG per tablet, TAKE 1 AND 1/2 TABLET BY MOUTH THREE TIMES A DAY, Disp: 135 tablet, Rfl: 3    METAMUCIL FIBER PO, Take by mouth, Disp: , Rfl:     GENERIC EXTERNAL MEDICATION, Liposomal glutathone, Disp: , Rfl:     allopurinol (ZYLOPRIM) 300 MG tablet, Take 1 tablet by mouth daily, Disp: , Rfl:     Cholecalciferol (VITAMIN D3) 125 MCG (5000 UT) TABS, Take by mouth daily, Disp: , Rfl:     GENERIC EXTERNAL MEDICATION, Curamin Pain Relief 4 capsules daily, Disp: , Rfl:     IODINE-POTASSIUM IODIDE PO, Take by mouth Iodoral IOD 12.5 -- 1 tablet daily, Disp: , Rfl:     FOLIC ACID PO, Take by mouth, Disp: , Rfl:     I reviewed the past medical history, allergies, medications, social history and family history.       PE:   Vitals:    01/24/24 1008   BP: 121/69   Site: Left Upper Arm   Position: Sitting   Cuff Size: Medium Adult   Pulse: 54   SpO2: 97%   Weight: 80.7 kg (178 lb)   Height: 1.753 m (5' 9\")        General Appearance:  alert and cooperative  Gen: NAD, Language is Intact. Skin: no rash, lesion, dry  to touch. warm  Head: no rash, no icterus  Neck: There is no carotid bruits. The Neck is supple.   Neuro: CN 2-12 grossly intact with no focal deficits. Power 5/5 Throughout symmetric, Reflexes are symmetric. Long tracts are intact. Cerebellar exam is Intact. Sensory exam is intact to light touch.  no resting tremor, no pinrolling +ve  mild bradykinesia,   +ve mild Hypohonia, +ve decreased blink rate, no difficulty exiting chair,  arm swing is intact, he is not stooped forward,  Gait normal  Musculoskeletal:  Has no hand arthritis, no limitation of ROM in any of the four extremities.  Lower extremities no edema        DATA:         Results for orders placed or performed in visit on 09/20/23   PSA,

## 2024-02-13 ENCOUNTER — OFFICE VISIT (OUTPATIENT)
Dept: PULMONOLOGY | Age: 69
End: 2024-02-13
Payer: MEDICARE

## 2024-02-13 VITALS
SYSTOLIC BLOOD PRESSURE: 100 MMHG | TEMPERATURE: 97.8 F | BODY MASS INDEX: 27.34 KG/M2 | OXYGEN SATURATION: 96 % | WEIGHT: 180.4 LBS | DIASTOLIC BLOOD PRESSURE: 68 MMHG | HEIGHT: 68 IN | HEART RATE: 60 BPM

## 2024-02-13 DIAGNOSIS — R06.83 SNORING: ICD-10-CM

## 2024-02-13 DIAGNOSIS — G47.19 EXCESSIVE DAYTIME SLEEPINESS: Primary | ICD-10-CM

## 2024-02-13 PROCEDURE — G8417 CALC BMI ABV UP PARAM F/U: HCPCS | Performed by: INTERNAL MEDICINE

## 2024-02-13 PROCEDURE — 3017F COLORECTAL CA SCREEN DOC REV: CPT | Performed by: INTERNAL MEDICINE

## 2024-02-13 PROCEDURE — G8427 DOCREV CUR MEDS BY ELIG CLIN: HCPCS | Performed by: INTERNAL MEDICINE

## 2024-02-13 PROCEDURE — G8484 FLU IMMUNIZE NO ADMIN: HCPCS | Performed by: INTERNAL MEDICINE

## 2024-02-13 PROCEDURE — 1123F ACP DISCUSS/DSCN MKR DOCD: CPT | Performed by: INTERNAL MEDICINE

## 2024-02-13 PROCEDURE — 1036F TOBACCO NON-USER: CPT | Performed by: INTERNAL MEDICINE

## 2024-02-13 PROCEDURE — 99204 OFFICE O/P NEW MOD 45 MIN: CPT | Performed by: INTERNAL MEDICINE

## 2024-02-13 NOTE — PROGRESS NOTES
New Sleep Patient H/P    Presentation:  Hugh is referred by paige leopold for poor interrupted sleep, not rested, snoring, and napping.     Mr. Patel is a 60-year-old gentleman with history of recently diagnosed Parkinson's disease who presents to clinic today unaccompanied for complaints of interrupted sleep, chronic excessive daytime fatigue, and snoring.  This has been ongoing for last couple of years.  Patient reports nonrestful sleep he will wake up feeling tired throughout the day.  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  He reports feeling especially tired by 3 PM which he will take a nap in his recliner for 4 hours most days.  Upon awakening he does feel better.  He denies being up with headaches.  He does not smoke.  He denies known sleep parasomnia such as sleep paralysis, sleepwalking, acting out his dreams, or falling out of bed but he does wake up tangled in his sheets and moves around in bed a lot during sleep.  He does not sleep with any pets.  He does not watch TV or use his phone or tablet in bed.  He does not have history of hypertension.  Does not feel tired or drowsy while driving.  No witnessed apneic events however patient lives alone.    His sleep schedule as follows: He goes to bed around 9094-8618 he falls asleep within 5 to 10 minutes.  He will wake up a couple times throughout the night sometimes to use the bathroom.  He then will go back to bed wake up at 0600 for work and 0730 on days off.     Past Medical History:   Diagnosis Date    Cancer (HCC)     prostate       Past Surgical History:   Procedure Laterality Date    CATARACT REMOVAL Bilateral 2022    and lense    COLONOSCOPY      OTHER SURGICAL HISTORY  07/19/2018    Staples in head from a fall    PROSTATECTOMY  07/14/2017    robotic    TYMPANOPLASTY Right 1985       Social History     Tobacco Use

## 2024-02-15 ENCOUNTER — HOSPITAL ENCOUNTER (OUTPATIENT)
Dept: SLEEP CENTER | Age: 69
Discharge: HOME | End: 2024-02-17
Payer: MEDICARE

## 2024-02-15 DIAGNOSIS — G47.19 EXCESSIVE DAYTIME SLEEPINESS: ICD-10-CM

## 2024-02-15 DIAGNOSIS — R06.83 SNORING: ICD-10-CM

## 2024-02-15 PROCEDURE — 95810 POLYSOM 6/> YRS 4/> PARAM: CPT

## 2024-02-16 DIAGNOSIS — G47.33 OSA (OBSTRUCTIVE SLEEP APNEA): Primary | ICD-10-CM

## 2024-02-19 NOTE — PROGRESS NOTES
[facility]    Hugh Patel, 68 y.o.  [id number]      Patient of Dr. Chong  Nurses Notes   Neck: 15.75  Mallampati: IV   Study Results  Initial Study Date -  2/15/24  AHI -  37.8    TotalEvents - 303  (Apneas  91  Hypopneas 221  Central  0)  LM w/Arousals - 0  Sleep Efficiency - 50.9 % (Total Sleep Time - 472 min)  Time with Sats below 88% - 50.9 min  Interval History       Hugh Patel is a 68 y.o. old male who comes in to review the results of his recent sleep study, to answer questions and to explore options for treatment. he continues to have symptoms of tossing and turning, decreased memory, decreased concentration, excessive daytime sleepiness, congested nose. Patient was initially evaluated by Dr. Chong on 2/13/24 as a new patient with referral from Paige Leopold for interrupted sleep, not rested, snoring, and napping. He was recently diagnosed with Parkinson's disease. He reports he lives by himself.       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    Weight lost 1 lbs over a couple weeks.     Allergies  No Known Allergies  Meds  Current Outpatient Medications   Medication Sig Dispense Refill    carbidopa-levodopa (SINEMET)  MG per tablet TAKE 1 AND 1/2 TABLET BY MOUTH THREE TIMES A  tablet 3    METAMUCIL FIBER PO Take by mouth      GENERIC EXTERNAL MEDICATION Liposomal glutathone      allopurinol (ZYLOPRIM) 300 MG tablet Take 1 tablet by mouth daily      Cholecalciferol (VITAMIN D3) 125 MCG (5000 UT) TABS Take by mouth daily      GENERIC EXTERNAL MEDICATION Curamin Pain Relief 4 capsules daily      IODINE-POTASSIUM IODIDE PO Take by mouth Iodoral IOD 12.5 -- 1 tablet daily      FOLIC ACID PO Take by mouth       No current facility-administered medications for this visit.     ROS  Review of Systems   Constitutional:  Negative for appetite change and fever.   HENT:  Positive for congestion. Negative for postnasal drip, rhinorrhea,

## 2024-02-20 ENCOUNTER — HOSPITAL ENCOUNTER (EMERGENCY)
Age: 69
Discharge: HOME | End: 2024-02-20
Attending: EMERGENCY MEDICINE
Payer: MEDICARE

## 2024-02-20 ENCOUNTER — OFFICE VISIT (OUTPATIENT)
Dept: PULMONOLOGY | Age: 69
End: 2024-02-20
Payer: MEDICARE

## 2024-02-20 ENCOUNTER — APPOINTMENT (OUTPATIENT)
Dept: GENERAL RADIOLOGY | Age: 69
End: 2024-02-20
Payer: MEDICARE

## 2024-02-20 VITALS
HEART RATE: 54 BPM | BODY MASS INDEX: 26.6 KG/M2 | TEMPERATURE: 98 F | HEIGHT: 69 IN | SYSTOLIC BLOOD PRESSURE: 102 MMHG | OXYGEN SATURATION: 96 % | DIASTOLIC BLOOD PRESSURE: 60 MMHG | WEIGHT: 179.6 LBS

## 2024-02-20 VITALS
RESPIRATION RATE: 15 BRPM | HEIGHT: 68 IN | SYSTOLIC BLOOD PRESSURE: 153 MMHG | HEART RATE: 59 BPM | BODY MASS INDEX: 27.28 KG/M2 | OXYGEN SATURATION: 96 % | DIASTOLIC BLOOD PRESSURE: 89 MMHG | WEIGHT: 180 LBS

## 2024-02-20 DIAGNOSIS — R42 DIZZINESS: Primary | ICD-10-CM

## 2024-02-20 DIAGNOSIS — G25.81 RESTLESS LEGS: ICD-10-CM

## 2024-02-20 DIAGNOSIS — G20.A1 PARKINSON'S DISEASE, UNSPECIFIED WHETHER DYSKINESIA PRESENT, UNSPECIFIED WHETHER MANIFESTATIONS FLUCTUATE: ICD-10-CM

## 2024-02-20 DIAGNOSIS — E66.3 OVERWEIGHT (BMI 25.0-29.9): ICD-10-CM

## 2024-02-20 DIAGNOSIS — G47.33 OSA (OBSTRUCTIVE SLEEP APNEA): Primary | ICD-10-CM

## 2024-02-20 DIAGNOSIS — R42 LIGHTHEADEDNESS: ICD-10-CM

## 2024-02-20 LAB
ALBUMIN SERPL BCG-MCNC: 4.3 G/DL (ref 3.5–5.1)
ALP SERPL-CCNC: 80 U/L (ref 38–126)
ALT SERPL W/O P-5'-P-CCNC: 23 U/L (ref 11–66)
ANION GAP SERPL CALC-SCNC: 10 MEQ/L (ref 8–16)
AST SERPL-CCNC: 30 U/L (ref 5–40)
BASOPHILS ABSOLUTE: 0 THOU/MM3 (ref 0–0.1)
BASOPHILS NFR BLD AUTO: 0.4 %
BILIRUB SERPL-MCNC: 0.6 MG/DL (ref 0.3–1.2)
BILIRUB UR QL STRIP.AUTO: NEGATIVE
BUN SERPL-MCNC: 22 MG/DL (ref 7–22)
CALCIUM SERPL-MCNC: 9.6 MG/DL (ref 8.5–10.5)
CHARACTER UR: CLEAR
CHLORIDE SERPL-SCNC: 103 MEQ/L (ref 98–111)
CO2 SERPL-SCNC: 27 MEQ/L (ref 23–33)
COLOR: YELLOW
CREAT SERPL-MCNC: 0.8 MG/DL (ref 0.4–1.2)
DEPRECATED RDW RBC AUTO: 46.6 FL (ref 35–45)
EOSINOPHIL NFR BLD AUTO: 1.5 %
EOSINOPHILS ABSOLUTE: 0.1 THOU/MM3 (ref 0–0.4)
ERYTHROCYTE [DISTWIDTH] IN BLOOD BY AUTOMATED COUNT: 13.8 % (ref 11.5–14.5)
GFR SERPL CREATININE-BSD FRML MDRD: > 60 ML/MIN/1.73M2
GLUCOSE BLD STRIP.AUTO-MCNC: 113 MG/DL (ref 70–108)
GLUCOSE SERPL-MCNC: 108 MG/DL (ref 70–108)
GLUCOSE UR QL STRIP.AUTO: NEGATIVE MG/DL
HCT VFR BLD AUTO: 41.1 % (ref 42–52)
HGB BLD-MCNC: 13.5 GM/DL (ref 14–18)
HGB UR QL STRIP.AUTO: NEGATIVE
IMM GRANULOCYTES # BLD AUTO: 0.01 THOU/MM3 (ref 0–0.07)
IMM GRANULOCYTES NFR BLD AUTO: 0.2 %
KETONES UR QL STRIP.AUTO: NEGATIVE
LYMPHOCYTES ABSOLUTE: 1.5 THOU/MM3 (ref 1–4.8)
LYMPHOCYTES NFR BLD AUTO: 32.6 %
MCH RBC QN AUTO: 30 PG (ref 26–33)
MCHC RBC AUTO-ENTMCNC: 32.8 GM/DL (ref 32.2–35.5)
MCV RBC AUTO: 91.3 FL (ref 80–94)
MONOCYTES ABSOLUTE: 0.5 THOU/MM3 (ref 0.4–1.3)
MONOCYTES NFR BLD AUTO: 11.1 %
NEUTROPHILS NFR BLD AUTO: 54.2 %
NITRITE UR QL STRIP: NEGATIVE
NRBC BLD AUTO-RTO: 0 /100 WBC
OSMOLALITY SERPL CALC.SUM OF ELEC: 283.3 MOSMOL/KG (ref 275–300)
PH UR STRIP.AUTO: 5.5 [PH] (ref 5–9)
PLATELET # BLD AUTO: 249 THOU/MM3 (ref 130–400)
PMV BLD AUTO: 9.6 FL (ref 9.4–12.4)
POTASSIUM SERPL-SCNC: 4.3 MEQ/L (ref 3.5–5.2)
PROT SERPL-MCNC: 7.3 G/DL (ref 6.1–8)
PROT UR STRIP.AUTO-MCNC: NEGATIVE MG/DL
RBC # BLD AUTO: 4.5 MILL/MM3 (ref 4.7–6.1)
SEGMENTED NEUTROPHILS ABSOLUTE COUNT: 2.5 THOU/MM3 (ref 1.8–7.7)
SODIUM SERPL-SCNC: 140 MEQ/L (ref 135–145)
SP GR UR REFRACT.AUTO: 1.01 (ref 1–1.03)
TROPONIN, HIGH SENSITIVITY: 7 NG/L (ref 0–12)
UROBILINOGEN, URINE: 0.2 EU/DL (ref 0–1)
WBC # BLD AUTO: 4.7 THOU/MM3 (ref 4.8–10.8)
WBC #/AREA URNS HPF: NEGATIVE /[HPF]

## 2024-02-20 PROCEDURE — 96360 HYDRATION IV INFUSION INIT: CPT

## 2024-02-20 PROCEDURE — 99214 OFFICE O/P EST MOD 30 MIN: CPT

## 2024-02-20 PROCEDURE — 96361 HYDRATE IV INFUSION ADD-ON: CPT

## 2024-02-20 PROCEDURE — 84484 ASSAY OF TROPONIN QUANT: CPT

## 2024-02-20 PROCEDURE — 1036F TOBACCO NON-USER: CPT

## 2024-02-20 PROCEDURE — 3017F COLORECTAL CA SCREEN DOC REV: CPT

## 2024-02-20 PROCEDURE — 93010 ELECTROCARDIOGRAM REPORT: CPT | Performed by: INTERNAL MEDICINE

## 2024-02-20 PROCEDURE — 71045 X-RAY EXAM CHEST 1 VIEW: CPT

## 2024-02-20 PROCEDURE — G8484 FLU IMMUNIZE NO ADMIN: HCPCS

## 2024-02-20 PROCEDURE — 2580000003 HC RX 258

## 2024-02-20 PROCEDURE — 99285 EMERGENCY DEPT VISIT HI MDM: CPT

## 2024-02-20 PROCEDURE — G8427 DOCREV CUR MEDS BY ELIG CLIN: HCPCS

## 2024-02-20 PROCEDURE — G8417 CALC BMI ABV UP PARAM F/U: HCPCS

## 2024-02-20 PROCEDURE — 93005 ELECTROCARDIOGRAM TRACING: CPT | Performed by: EMERGENCY MEDICINE

## 2024-02-20 PROCEDURE — 82948 REAGENT STRIP/BLOOD GLUCOSE: CPT

## 2024-02-20 PROCEDURE — 85025 COMPLETE CBC W/AUTO DIFF WBC: CPT

## 2024-02-20 PROCEDURE — 36415 COLL VENOUS BLD VENIPUNCTURE: CPT

## 2024-02-20 PROCEDURE — 81003 URINALYSIS AUTO W/O SCOPE: CPT

## 2024-02-20 PROCEDURE — 80053 COMPREHEN METABOLIC PANEL: CPT

## 2024-02-20 PROCEDURE — 1123F ACP DISCUSS/DSCN MKR DOCD: CPT

## 2024-02-20 RX ORDER — 0.9 % SODIUM CHLORIDE 0.9 %
1000 INTRAVENOUS SOLUTION INTRAVENOUS ONCE
Status: COMPLETED | OUTPATIENT
Start: 2024-02-20 | End: 2024-02-20

## 2024-02-20 RX ADMIN — SODIUM CHLORIDE 1000 ML: 9 INJECTION, SOLUTION INTRAVENOUS at 14:26

## 2024-02-20 ASSESSMENT — ENCOUNTER SYMPTOMS
SORE THROAT: 0
COUGH: 0
WHEEZING: 0
SHORTNESS OF BREATH: 0
RHINORRHEA: 0

## 2024-02-20 ASSESSMENT — PAIN - FUNCTIONAL ASSESSMENT: PAIN_FUNCTIONAL_ASSESSMENT: NONE - DENIES PAIN

## 2024-02-20 NOTE — DISCHARGE INSTRUCTIONS
Call today or tomorrow to follow up with Sol Howard MD  in 3 days.    Get up slowly; dangle your feet over the bed before standing up, do not stand up quickly.    Return to the Emergency Department for blacking out, any headache, slurring of speech, loss of strength, excessive nausea or vomiting, any other care or concern.

## 2024-02-20 NOTE — ED PROVIDER NOTES
[facility] EMERGENCY DEPARTMENT    EMERGENCY MEDICINE     Patient Name: Hugh Patel  MRN: [id number]  YOB: 1955  Date of Evaluation: 2/20/2024  Treating Resident Physician: Urszula Vargas DO  Supervising Physician: Dr. Keshav Guerrero DO    CHIEF COMPLAINT       Chief Complaint   Patient presents with    Fatigue       HISTORY OF PRESENT ILLNESS      History obtained from the patient.    Hugh Patel is a 68 y.o. male who presents to the emergency department  hospital  as a walk in to the ED [facility] for evaluation of dizziness.  Patient had an appointment at 1130 with was a clinic for his sleep apnea study, while he was waiting he felt dizzy came to the ED for an evaluation.  Past medical history of Parkinson's disease and prostatectomy.  Patient has no headache no vision changes, no nausea vomiting, no chest pain, no shortness of breath, no abdominal pain, he does report some urinary straining, no diarrhea.  Patient is not on any anticoagulations or blood thinners.  Patient did take holistic medication for pain today, at this point unknown what the medication is called.  No recent sick contacts or exposures to flu or COVID.    Pertinent previous and/or external records reviewed: Non-contributory    PAST MEDICAL AND SURGICAL HISTORY     Past Medical History:   Diagnosis Date    Cancer (HCC)     prostate       Past Surgical History:   Procedure Laterality Date    CATARACT REMOVAL Bilateral 2022    and lense    COLONOSCOPY      OTHER SURGICAL HISTORY  07/19/2018    Staples in head from a fall    PROSTATECTOMY  07/14/2017    robotic    TYMPANOPLASTY Right 1985       CURRENT MEDICATIONS     Discharge Medication List as of 2/20/2024  3:57 PM        CONTINUE these medications which have NOT CHANGED    Details   carbidopa-levodopa (SINEMET)  MG per tablet TAKE 1 AND 1/2 TABLET BY MOUTH THREE TIMES A DAY, Disp-135 tablet, R-3Normal      METAMUCIL FIBER PO Take by mouthHistorical Med      GENERIC EXTERNAL

## 2024-02-22 ENCOUNTER — HOSPITAL ENCOUNTER (EMERGENCY)
Age: 69
Discharge: HOME | End: 2024-02-22
Payer: MEDICARE

## 2024-02-22 VITALS
WEIGHT: 172 LBS | RESPIRATION RATE: 16 BRPM | BODY MASS INDEX: 26.15 KG/M2 | OXYGEN SATURATION: 96 % | SYSTOLIC BLOOD PRESSURE: 140 MMHG | HEART RATE: 67 BPM | TEMPERATURE: 98.1 F | DIASTOLIC BLOOD PRESSURE: 91 MMHG

## 2024-02-22 DIAGNOSIS — G57.01 PIRIFORMIS SYNDROME OF RIGHT SIDE: Primary | ICD-10-CM

## 2024-02-22 DIAGNOSIS — M79.2 NERVE PAIN: ICD-10-CM

## 2024-02-22 LAB
EKG ATRIAL RATE: 51 BPM
EKG P AXIS: 28 DEGREES
EKG P-R INTERVAL: 122 MS
EKG Q-T INTERVAL: 418 MS
EKG QRS DURATION: 90 MS
EKG QTC CALCULATION (BAZETT): 385 MS
EKG R AXIS: 35 DEGREES
EKG T AXIS: 49 DEGREES
EKG VENTRICULAR RATE: 51 BPM

## 2024-02-22 PROCEDURE — 99283 EMERGENCY DEPT VISIT LOW MDM: CPT

## 2024-02-22 RX ORDER — METHOCARBAMOL 750 MG/1
750 TABLET, FILM COATED ORAL 4 TIMES DAILY
Qty: 40 TABLET | Refills: 0 | Status: SHIPPED | OUTPATIENT
Start: 2024-02-22 | End: 2024-03-03

## 2024-02-22 ASSESSMENT — PAIN DESCRIPTION - LOCATION: LOCATION: LEG

## 2024-02-22 ASSESSMENT — PAIN SCALES - GENERAL: PAINLEVEL_OUTOF10: 2

## 2024-02-22 ASSESSMENT — PAIN DESCRIPTION - ORIENTATION: ORIENTATION: RIGHT;LEFT

## 2024-02-22 NOTE — ED NOTES
Patient presents to the ED with concerns of bilateral leg pain and burning. Patient provided a typed letter of things he has concerns of. He rates burning pain at a 2/10.

## 2024-03-06 ENCOUNTER — TELEPHONE (OUTPATIENT)
Dept: NEUROLOGY | Age: 69
End: 2024-03-06

## 2024-03-06 NOTE — TELEPHONE ENCOUNTER
Patient reports he has been having for the last 2-3 weeks: Ringing in the ears, slurred speech, dizziness, headaches, and hallucinations. Patient reports he has been in the ER a couple different times for this in the last few weeks. Patient last seen in office on 1/24/24 and has a f/u appt scheduled with Dr. Gracia on 5/24/24.    Records requested from [facility]

## 2024-05-02 NOTE — PROGRESS NOTES
and lense    COLONOSCOPY      OTHER SURGICAL HISTORY  2018    Staples in head from a fall    PROSTATECTOMY  2017    robotic    TYMPANOPLASTY Right 1985     SOCIAL HISTORY:  Social History     Tobacco Use    Smoking status: Former     Current packs/day: 0.00     Average packs/day: 0.3 packs/day for 8.0 years (2.0 ttl pk-yrs)     Types: Cigarettes     Start date:      Quit date:      Years since quittin.3    Smokeless tobacco: Never   Substance Use Topics    Alcohol use: Yes     Comment: socially    Drug use: No     ALLERGIES:No Known Allergies  FAMILY HISTORY:  Family History   Problem Relation Age of Onset    Breast Cancer Mother         mid 60's    Prostate Cancer Father         62/63    Parkinson's Disease Sister     Breast Cancer Sister 62    Prostate Cancer Brother 65    Diabetes Paternal Aunt     Heart Disease Neg Hx     High Blood Pressure Neg Hx     Stroke Neg Hx      CURRENT MEDICATIONS:  Current Outpatient Medications   Medication Sig Dispense Refill    atorvastatin (LIPITOR) 20 MG tablet Take by mouth      aspirin 81 MG EC tablet Take by mouth      ibuprofen (ADVIL;MOTRIN) 200 MG tablet Take by mouth      acetaminophen (TYLENOL) 500 MG tablet Take by mouth      Misc. Devices (CPAP MACHINE) MISC by Does not apply route Please change his current CPAP/BiPAP pressure to a CPAP Auto Adjust 6 - 10 cm H2O    Please pull download in 2 weeks 1 each 0    etodolac (LODINE) 300 MG capsule Take 1 capsule by mouth in the morning and 1 capsule at noon and 1 capsule in the evening. 9 capsule 0    carbidopa-levodopa (SINEMET)  MG per tablet TAKE 1 AND 1/2 TABLET BY MOUTH THREE TIMES A  tablet 3    METAMUCIL FIBER PO Take by mouth      GENERIC EXTERNAL MEDICATION Liposomal glutathone      allopurinol (ZYLOPRIM) 300 MG tablet Take 1 tablet by mouth daily      Cholecalciferol (VITAMIN D3) 125 MCG (5000 UT) TABS Take by mouth daily      GENERIC EXTERNAL MEDICATION Curamin Pain Relief 4

## 2024-05-03 ENCOUNTER — OFFICE VISIT (OUTPATIENT)
Dept: PULMONOLOGY | Age: 69
End: 2024-05-03
Payer: MEDICARE

## 2024-05-03 VITALS
DIASTOLIC BLOOD PRESSURE: 68 MMHG | HEIGHT: 69 IN | TEMPERATURE: 97.1 F | HEART RATE: 47 BPM | SYSTOLIC BLOOD PRESSURE: 110 MMHG | BODY MASS INDEX: 26.19 KG/M2 | WEIGHT: 176.8 LBS | OXYGEN SATURATION: 95 %

## 2024-05-03 DIAGNOSIS — R00.1 BRADYCARDIA: ICD-10-CM

## 2024-05-03 DIAGNOSIS — G47.19 EXCESSIVE DAYTIME SLEEPINESS: ICD-10-CM

## 2024-05-03 DIAGNOSIS — E66.3 OVERWEIGHT (BMI 25.0-29.9): ICD-10-CM

## 2024-05-03 DIAGNOSIS — G47.33 OSA (OBSTRUCTIVE SLEEP APNEA): Primary | ICD-10-CM

## 2024-05-03 PROCEDURE — G8417 CALC BMI ABV UP PARAM F/U: HCPCS

## 2024-05-03 PROCEDURE — 1123F ACP DISCUSS/DSCN MKR DOCD: CPT

## 2024-05-03 PROCEDURE — 1036F TOBACCO NON-USER: CPT

## 2024-05-03 PROCEDURE — 99214 OFFICE O/P EST MOD 30 MIN: CPT

## 2024-05-03 PROCEDURE — G8427 DOCREV CUR MEDS BY ELIG CLIN: HCPCS

## 2024-05-03 PROCEDURE — 3017F COLORECTAL CA SCREEN DOC REV: CPT

## 2024-05-03 RX ORDER — ATORVASTATIN CALCIUM 20 MG/1
TABLET, FILM COATED ORAL
COMMUNITY
Start: 2024-03-01

## 2024-05-03 RX ORDER — ACETAMINOPHEN 500 MG
TABLET ORAL
COMMUNITY

## 2024-05-03 RX ORDER — IBUPROFEN 200 MG
TABLET ORAL
COMMUNITY

## 2024-05-03 RX ORDER — ASPIRIN 81 MG/1
TABLET ORAL
COMMUNITY
Start: 2024-03-01

## 2024-05-03 ASSESSMENT — ENCOUNTER SYMPTOMS
WHEEZING: 0
RHINORRHEA: 0
COUGH: 0
SHORTNESS OF BREATH: 0
SORE THROAT: 0

## 2024-05-17 ENCOUNTER — TELEPHONE (OUTPATIENT)
Dept: PULMONOLOGY | Age: 69
End: 2024-05-17

## 2024-05-17 NOTE — TELEPHONE ENCOUNTER
LVM for patient informing him of Mehnaz's message and asking him to let us know if he is having issues on PAP setting.

## 2024-05-17 NOTE — TELEPHONE ENCOUNTER
Please notify patient that AHI looks good on download but I see he still is not showing food compliance. Please see if patient is doing better on current settings and please encourage more use on machine. Thanks!

## 2024-05-20 NOTE — TELEPHONE ENCOUNTER
Hugh S Colson called requesting a refill on the following medications:  Requested Prescriptions     Pending Prescriptions Disp Refills    carbidopa-levodopa (SINEMET)  MG per tablet [Pharmacy Med Name: CARBIDOPA-LEVODOPA  TAB] 135 tablet 3     Sig: TAKE 1 AND 1/2 TABLET BY MOUTH THREE TIMES A DAY       Date of last visit: 1/24/2024- Paige Leopold, CNP  Date of next visit (if applicable):5/24/24- Dr. Gracia  Date of last refill: 1/17/24  Pharmacy Name: [facility] LPN

## 2024-05-24 ENCOUNTER — OFFICE VISIT (OUTPATIENT)
Dept: NEUROLOGY | Age: 69
End: 2024-05-24
Payer: MEDICARE

## 2024-05-24 VITALS
BODY MASS INDEX: 27.25 KG/M2 | DIASTOLIC BLOOD PRESSURE: 70 MMHG | SYSTOLIC BLOOD PRESSURE: 122 MMHG | OXYGEN SATURATION: 97 % | HEIGHT: 69 IN | WEIGHT: 184 LBS | HEART RATE: 57 BPM

## 2024-05-24 DIAGNOSIS — R25.8 BRADYKINESIA: ICD-10-CM

## 2024-05-24 DIAGNOSIS — G20.A2 PARKINSON'S DISEASE WITHOUT DYSKINESIA, WITH FLUCTUATING MANIFESTATIONS (HCC): Primary | ICD-10-CM

## 2024-05-24 DIAGNOSIS — R53.83 OTHER FATIGUE: ICD-10-CM

## 2024-05-24 DIAGNOSIS — R49.8 HYPOPHONIA: ICD-10-CM

## 2024-05-24 DIAGNOSIS — H93.13 TINNITUS OF BOTH EARS: ICD-10-CM

## 2024-05-24 PROCEDURE — 99214 OFFICE O/P EST MOD 30 MIN: CPT | Performed by: PSYCHIATRY & NEUROLOGY

## 2024-05-24 PROCEDURE — 3017F COLORECTAL CA SCREEN DOC REV: CPT | Performed by: PSYCHIATRY & NEUROLOGY

## 2024-05-24 PROCEDURE — 1036F TOBACCO NON-USER: CPT | Performed by: PSYCHIATRY & NEUROLOGY

## 2024-05-24 PROCEDURE — G8417 CALC BMI ABV UP PARAM F/U: HCPCS | Performed by: PSYCHIATRY & NEUROLOGY

## 2024-05-24 PROCEDURE — G8427 DOCREV CUR MEDS BY ELIG CLIN: HCPCS | Performed by: PSYCHIATRY & NEUROLOGY

## 2024-05-24 PROCEDURE — 1123F ACP DISCUSS/DSCN MKR DOCD: CPT | Performed by: PSYCHIATRY & NEUROLOGY

## 2024-05-24 NOTE — PATIENT INSTRUCTIONS
Change Sinemet 25/100 mg to 2 tablets three times a day, take every 5 hours while awake, Take at 7am, 12pm, 5pm daily  Tilt table test  Will send for audiology testing.   You need to stay physically active  Call with any new symptoms or concerns.   Follow up in 4 months

## 2024-05-24 NOTE — PROGRESS NOTES
NEUROLOGY OUT PATIENT FOLLOW UP NOTE:  5/24/202410:29 AM    Hugh Patel is here for follow up for parkinson's disease. He is here to go over plan.       No Known Allergies    Current Outpatient Medications:     carbidopa-levodopa (SINEMET)  MG per tablet, TAKE 1 AND 1/2 TABLET BY MOUTH THREE TIMES A DAY, Disp: 135 tablet, Rfl: 3    aspirin 81 MG EC tablet, Take by mouth, Disp: , Rfl:     ibuprofen (ADVIL;MOTRIN) 200 MG tablet, Take by mouth, Disp: , Rfl:     acetaminophen (TYLENOL) 500 MG tablet, Take by mouth, Disp: , Rfl:     Misc. Devices (CPAP MACHINE) MISC, by Does not apply route Please change his current CPAP/BiPAP pressure to a CPAP Auto Adjust 6 - 10 cm H2O  Please pull download in 2 weeks, Disp: 1 each, Rfl: 0    METAMUCIL FIBER PO, Take by mouth, Disp: , Rfl:     GENERIC EXTERNAL MEDICATION, Liposomal glutathone, Disp: , Rfl:     allopurinol (ZYLOPRIM) 300 MG tablet, Take 1 tablet by mouth daily, Disp: , Rfl:     Cholecalciferol (VITAMIN D3) 125 MCG (5000 UT) TABS, Take by mouth daily, Disp: , Rfl:     GENERIC EXTERNAL MEDICATION, Curamin Pain Relief 4 capsules daily, Disp: , Rfl:     IODINE-POTASSIUM IODIDE PO, Take by mouth Iodoral IOD 12.5 -- 1 tablet daily, Disp: , Rfl:     FOLIC ACID PO, Take by mouth, Disp: , Rfl:     I reviewed the past medical history, allergies, medications, social history and family history.       PE:   Vitals:    05/24/24 1011   BP: 122/70   Site: Left Upper Arm   Position: Sitting   Cuff Size: Medium Adult   Pulse: 57   SpO2: 97%   Weight: 83.5 kg (184 lb)   Height: 1.753 m (5' 9\")        General Appearance:  alert and cooperative  Gen: NAD, Language is Intact. Skin: no rash, lesion, dry  to touch. warm  Head: no rash, no icterus  Neck: The Neck is supple.   Neuro: CN 2-12 grossly intact with no focal deficits. Power 5/5 Throughout symmetric, Reflexes are symmetric. Long tracts are intact. Cerebellar exam is Intact. Sensory exam is intact to light touch.  no resting

## 2024-05-28 ENCOUNTER — TELEPHONE (OUTPATIENT)
Dept: NEUROLOGY | Age: 69
End: 2024-05-28

## 2024-05-28 DIAGNOSIS — G20.A2 PARKINSON'S DISEASE WITHOUT DYSKINESIA, WITH FLUCTUATING MANIFESTATIONS (HCC): Primary | ICD-10-CM

## 2024-05-28 DIAGNOSIS — R25.8 BRADYKINESIA: ICD-10-CM

## 2024-05-28 DIAGNOSIS — R49.8 HYPOPHONIA: ICD-10-CM

## 2024-05-28 NOTE — TELEPHONE ENCOUNTER
LOV: 5/24/24- Dr. Gracia  F/u : 9/30/24- Dr. Samia Mackenzie from [facility] called and is questioning if you would order PT for patient? The facility would like to start physical therapy today if possible. Fax # is [phone].

## 2024-06-14 ENCOUNTER — HOSPITAL ENCOUNTER (OUTPATIENT)
Dept: AUDIOLOGY | Age: 69
Discharge: HOME | End: 2024-06-14
Payer: MEDICARE

## 2024-06-14 PROCEDURE — 92557 COMPREHENSIVE HEARING TEST: CPT | Performed by: AUDIOLOGIST

## 2024-06-14 PROCEDURE — 92567 TYMPANOMETRY: CPT | Performed by: AUDIOLOGIST

## 2024-06-14 NOTE — PROGRESS NOTES
AUDIOLOGICAL EVALUATION      REASON FOR TESTING:  Audiometric evaluation per the request of Prabhjot Gracia MD, due to the diagnosis of tinnitus. The patient's health history is also significant for Parkinson's, bradykinesia, hypophonia and fatigue. The patient reports long-standing, bilateral, \"ringing\" tinnitus which may be more noticeable in his right ear. He reports a history of previous right ear surgery with possible tympanoplasty, performed at [facility] several years ago. He denies any current symptoms of otalgia, otorrhea, aural fullness, or vertigo. He reports a minimal history of loud noise exposure.     OTOSCOPY: Clear external ear canals, tympanic membrane are visible/WNL, bilaterally.     AUDIOGRAM        Reliability: Good    COMMENTS: Pure tone audiogram indicates normal low frequency sloping to moderate high frequency sensorineural hearing loss in the left ear with a \"notched\" configuration around 3000-4000Hz. Right ear thresholds indicate a mild sloping to moderately-severe high frequency mixed hearing loss with a slight air-bone gap noted at 250Hz only. Speech reception thresholds agree with pure tone findings, bilaterally. Word recognition scores are excellent, bilaterally, with speech presented at slightly elevated but comfortable listening levels in quiet. Tympanometry indicates normal ear canal volume and peak pressure with hypercompliant tympanic membrane mobility in the right ear (2.31mmho). The left tympanogram shows a normal canal volume and normal compliance with slightly negative peak pressure (-93daPa).       RECOMMENDATION(S):   Follow up with referring provider as planned.  Consider binaural amplification with tinnitus masking option pending medical clearance and patient motivation. Patient advised to contact Audiology department to schedule hearing aid evaluation when ready.  General tinnitus management strategies, including hearing aids and sound therapy, discussed with

## 2024-07-11 ENCOUNTER — TELEPHONE (OUTPATIENT)
Dept: NEUROLOGY | Age: 69
End: 2024-07-11

## 2024-07-11 DIAGNOSIS — R49.8 HYPOPHONIA: ICD-10-CM

## 2024-07-11 DIAGNOSIS — R25.8 BRADYKINESIA: ICD-10-CM

## 2024-07-11 DIAGNOSIS — G20.A2 PARKINSON'S DISEASE WITHOUT DYSKINESIA, WITH FLUCTUATING MANIFESTATIONS (HCC): Primary | ICD-10-CM

## 2024-07-11 NOTE — TELEPHONE ENCOUNTER
LOV: 5/24/24- Dr. Samia Craven- PT from [facility] reports patient completed therapy and will transition to rock steady boxing. Avril feels like patient would benefit from ST and is asking for a referral to . Patient would like to have the referral sent to [facility] for ST at [phone].

## 2024-07-18 ENCOUNTER — HOSPITAL ENCOUNTER (OUTPATIENT)
Age: 69
Discharge: HOME | End: 2024-07-20
Attending: PSYCHIATRY & NEUROLOGY
Payer: MEDICARE

## 2024-07-18 DIAGNOSIS — H93.13 TINNITUS OF BOTH EARS: ICD-10-CM

## 2024-07-18 DIAGNOSIS — R25.8 BRADYKINESIA: ICD-10-CM

## 2024-07-18 DIAGNOSIS — R53.83 OTHER FATIGUE: ICD-10-CM

## 2024-07-18 DIAGNOSIS — G20.A2 PARKINSON'S DISEASE WITHOUT DYSKINESIA, WITH FLUCTUATING MANIFESTATIONS (HCC): ICD-10-CM

## 2024-07-18 DIAGNOSIS — R49.8 HYPOPHONIA: ICD-10-CM

## 2024-07-18 LAB
TILT CV INITIAL SUPINE HEART RATE: 57 BPM
TILT CV INITIAL SUPINE MAX BP: NORMAL BPM
TILT CV INITIAL TILT BLOOD PRESSURE: NORMAL MMHG
TILT CV INITIAL TILT HEART RATE: 62 BPM
TILT CV MAX BP BLOOD PRESSURE: NORMAL MMHG
TILT CV MAX BP HEART RATE: 57 BPM
TILT CV MAX BP MINUTES: 18
TILT CV MAX BP SECONDS: 0
TILT CV MAX HEART RATE: 67 BPM
TILT CV MAX HR BLOOD PRESSURE: NORMAL MMHG
TILT CV MAX HR MINUTES: 21
TILT CV MAX HR SECONDS: 0
TILT CV MINIMUM BP BLOOD PRESSURE: NORMAL MMHG
TILT CV MINIMUM BP HEART RATE: 59 BPM
TILT CV MINIMUM BP MINUTES: 3
TILT CV MINIMUM BP SECONDS: 0
TILT CV MINIMUM HR BP: NORMAL MMHG
TILT CV MINIMUM HR HEART RATE: 50 BPM
TILT CV MINIMUM HR MINUTES: 11
TILT CV MINIMUM HR SECONDS: 0

## 2024-07-18 PROCEDURE — 93660 TILT TABLE EVALUATION: CPT

## 2024-07-18 PROCEDURE — 2580000003 HC RX 258: Performed by: PSYCHIATRY & NEUROLOGY

## 2024-07-18 PROCEDURE — 93660 TILT TABLE EVALUATION: CPT | Performed by: NUCLEAR MEDICINE

## 2024-07-18 RX ORDER — SODIUM CHLORIDE 9 MG/ML
INJECTION, SOLUTION INTRAVENOUS CONTINUOUS
Status: DISCONTINUED | OUTPATIENT
Start: 2024-07-18 | End: 2024-07-21 | Stop reason: HOSPADM

## 2024-07-18 RX ADMIN — SODIUM CHLORIDE: 9 INJECTION, SOLUTION INTRAVENOUS at 14:03

## 2024-08-07 NOTE — PROGRESS NOTES
[facility]      Hugh Patel         [id number]  8/8/2024   Chief Complaint   Patient presents with    Follow-up     Quinn 3 month f/u with download        Pt of Dr. Chong    Assessment/Plan   1. QUINN (obstructive sleep apnea)  -Yearly supply order placed? No  -Download reviewed and discussed with patient.  -The symptoms of QUINN have improved. The patient is benefiting from therapy and should continue use of PAP device. I have reviewed the download.  -Patient's symptoms and AHI are not optimally controlled with current settings of 6-10 cmH2O. Patient feels pressure is too high. Adjust current pressure settings to 6 - 8 cm H2O. DME to pull a download in 2 weeks  -Discussed Inspire in detail with patient. Appears he would qualify based on sleep study. Patient wishes to continue PAP therapy for the next few months and then if still having difficulty with machine use, consider Inspire. Will plan to discuss further at next visit. Patient to call for earlier appt if symptoms worsen/fail to improve. He voiced understanding  -Advised to continue current positive airway pressure therapy with above described pressure.   -Advised to keep good compliance with current recommended pressure to get optimal results and clinical improvement  -Recommend 7-9 hours of sleep with PAP  -Instructed to call DME company regarding supplies if needed.   -Patient to call my office for earlier appointment if needed for worsening of sleep symptoms.     2. Overweight (BMI 25.0-29.9) - weight stable  -Counseled patient on weight loss    3. Excessive daytime sleepiness - suspect due to inadequately controlled sleep apnea as well as poor sleep hygiene  -Discussed sleep hygiene in depth with patient. Advised patient not to take daytime naps and to not watch TV about 30-60 minutes before bedtime  -Advised patient to work on PAP compliance. Wearing every night and wearing for his full sleep duration  -Advised

## 2024-08-08 ENCOUNTER — OFFICE VISIT (OUTPATIENT)
Dept: PULMONOLOGY | Age: 69
End: 2024-08-08
Payer: MEDICARE

## 2024-08-08 VITALS
WEIGHT: 180.2 LBS | TEMPERATURE: 98 F | BODY MASS INDEX: 26.69 KG/M2 | HEART RATE: 53 BPM | HEIGHT: 69 IN | DIASTOLIC BLOOD PRESSURE: 70 MMHG | OXYGEN SATURATION: 96 % | SYSTOLIC BLOOD PRESSURE: 118 MMHG

## 2024-08-08 DIAGNOSIS — E66.3 OVERWEIGHT (BMI 25.0-29.9): ICD-10-CM

## 2024-08-08 DIAGNOSIS — G47.33 OSA (OBSTRUCTIVE SLEEP APNEA): Primary | ICD-10-CM

## 2024-08-08 DIAGNOSIS — G47.19 EXCESSIVE DAYTIME SLEEPINESS: ICD-10-CM

## 2024-08-08 PROCEDURE — G8417 CALC BMI ABV UP PARAM F/U: HCPCS

## 2024-08-08 PROCEDURE — 1036F TOBACCO NON-USER: CPT

## 2024-08-08 PROCEDURE — G8427 DOCREV CUR MEDS BY ELIG CLIN: HCPCS

## 2024-08-08 PROCEDURE — 3017F COLORECTAL CA SCREEN DOC REV: CPT

## 2024-08-08 PROCEDURE — 1123F ACP DISCUSS/DSCN MKR DOCD: CPT

## 2024-08-08 PROCEDURE — 99214 OFFICE O/P EST MOD 30 MIN: CPT

## 2024-08-08 ASSESSMENT — ENCOUNTER SYMPTOMS
RHINORRHEA: 1
COUGH: 0
SHORTNESS OF BREATH: 0
WHEEZING: 0
SORE THROAT: 0

## 2024-08-08 NOTE — PATIENT INSTRUCTIONS
[email or web address]-[email or web address]    Continue current positive airway pressure therapy.    Keep good compliance with current recommended pressure to get optimal results and clinical improvement    Make sure to get 7-9 hours of sleep with PAP    Call DME company regarding supplies if needed.     Call office for earlier appointment if needed for worsening of sleep symptoms.     Do not drive if feeling sleepy    Make sure to work on weight loss

## 2024-08-23 ENCOUNTER — TELEPHONE (OUTPATIENT)
Dept: PULMONOLOGY | Age: 69
End: 2024-08-23

## 2024-08-23 NOTE — TELEPHONE ENCOUNTER
Download after pressure change attached to this encounter. Please review and notify me if any further changes are needed so that I may inform the patient. Thank you!  -  Dates: 08/08/2024 - 08/21/2024  DME: [facility]  AHI: 1.5

## 2024-08-25 NOTE — TELEPHONE ENCOUNTER
Download looks good on current settings. He is leaking. Please see how he is feeling on current settings. Thanks!

## 2024-08-30 NOTE — TELEPHONE ENCOUNTER
Patient called back to let us know he is noting improvement. States he rolls around a lot which is where he believes the leaking is coming from . I advised him to call back with any issues or changes.

## 2024-09-05 ENCOUNTER — OFFICE VISIT (OUTPATIENT)
Dept: UROLOGY | Age: 69
End: 2024-09-05
Payer: MEDICARE

## 2024-09-05 VITALS
HEIGHT: 69 IN | BODY MASS INDEX: 26.54 KG/M2 | DIASTOLIC BLOOD PRESSURE: 84 MMHG | SYSTOLIC BLOOD PRESSURE: 120 MMHG | WEIGHT: 179.2 LBS

## 2024-09-05 DIAGNOSIS — C61 PROSTATE CANCER (HCC): Primary | ICD-10-CM

## 2024-09-05 PROCEDURE — G8417 CALC BMI ABV UP PARAM F/U: HCPCS | Performed by: NURSE PRACTITIONER

## 2024-09-05 PROCEDURE — G8427 DOCREV CUR MEDS BY ELIG CLIN: HCPCS | Performed by: NURSE PRACTITIONER

## 2024-09-05 PROCEDURE — 1123F ACP DISCUSS/DSCN MKR DOCD: CPT | Performed by: NURSE PRACTITIONER

## 2024-09-05 PROCEDURE — 99213 OFFICE O/P EST LOW 20 MIN: CPT | Performed by: NURSE PRACTITIONER

## 2024-09-05 PROCEDURE — 3017F COLORECTAL CA SCREEN DOC REV: CPT | Performed by: NURSE PRACTITIONER

## 2024-09-05 PROCEDURE — 1036F TOBACCO NON-USER: CPT | Performed by: NURSE PRACTITIONER

## 2024-09-05 ASSESSMENT — ENCOUNTER SYMPTOMS
ABDOMINAL PAIN: 0
VOMITING: 0
NAUSEA: 0
BACK PAIN: 0

## 2024-09-05 NOTE — PROGRESS NOTES
deviation.   Cardiovascular:      Rate and Rhythm: Normal rate and regular rhythm.   Pulmonary:      Effort: Pulmonary effort is normal. No respiratory distress.   Abdominal:      General: There is no distension.      Tenderness: There is no abdominal tenderness. There is no right CVA tenderness or left CVA tenderness.   Musculoskeletal:         General: Normal range of motion.   Skin:     General: Skin is warm and dry.   Neurological:      Mental Status: He is alert and oriented to person, place, and time. Mental status is at baseline.   Psychiatric:         Mood and Affect: Mood normal.         Behavior: Behavior normal.         Thought Content: Thought content normal.         POC  No results found for this visit on 09/05/24.      Patients recent PSA values are as follows  Lab Results   Component Value Date    PSA  09/03/2024      Comment:      <0.03    PSA  09/18/2023      Comment:      <0.03    PSA  09/19/2022      Comment:      <0.03        Recent BUN/Creatinine:  Lab Results   Component Value Date/Time    BUN 22 02/20/2024 01:51 PM    CREATININE 0.8 02/20/2024 01:51 PM       Assessment:   Prostate cancer  Urinary frequency, urgency, nocturia  Erectile dysfunction    Plan:   Pt's PSA is <0.03 9/3/24 showing excellent control of prostate cancer.  Repeat PSA in 1 year.  Provided with order.      Pt having nocturia and some intermittent episodes of incontinence during sleep.  Discussed possible addition of trospium 20 mg nightly. Pt prefers to hold off at this time. Again discussed stopping fluids 2 hours prior to bedtime and avoiding alcohol and caffeine in the evening.  Pt will call if he wishes to trial medication prior to f/u appt.      F/u in 1 year with PSA a few days prior.

## 2024-09-30 ENCOUNTER — OFFICE VISIT (OUTPATIENT)
Dept: NEUROLOGY | Age: 69
End: 2024-09-30
Payer: MEDICARE

## 2024-09-30 VITALS
HEART RATE: 54 BPM | HEIGHT: 69 IN | DIASTOLIC BLOOD PRESSURE: 72 MMHG | BODY MASS INDEX: 26.51 KG/M2 | OXYGEN SATURATION: 98 % | WEIGHT: 179 LBS | SYSTOLIC BLOOD PRESSURE: 130 MMHG

## 2024-09-30 DIAGNOSIS — R49.8 HYPOPHONIA: ICD-10-CM

## 2024-09-30 DIAGNOSIS — R53.83 OTHER FATIGUE: ICD-10-CM

## 2024-09-30 DIAGNOSIS — G20.A2 PARKINSON'S DISEASE WITHOUT DYSKINESIA, WITH FLUCTUATING MANIFESTATIONS (HCC): Primary | ICD-10-CM

## 2024-09-30 DIAGNOSIS — R25.8 BRADYKINESIA: ICD-10-CM

## 2024-09-30 PROCEDURE — G8428 CUR MEDS NOT DOCUMENT: HCPCS | Performed by: PSYCHIATRY & NEUROLOGY

## 2024-09-30 PROCEDURE — G8417 CALC BMI ABV UP PARAM F/U: HCPCS | Performed by: PSYCHIATRY & NEUROLOGY

## 2024-09-30 PROCEDURE — 1123F ACP DISCUSS/DSCN MKR DOCD: CPT | Performed by: PSYCHIATRY & NEUROLOGY

## 2024-09-30 PROCEDURE — 99214 OFFICE O/P EST MOD 30 MIN: CPT | Performed by: PSYCHIATRY & NEUROLOGY

## 2024-09-30 PROCEDURE — 1036F TOBACCO NON-USER: CPT | Performed by: PSYCHIATRY & NEUROLOGY

## 2024-09-30 PROCEDURE — 3017F COLORECTAL CA SCREEN DOC REV: CPT | Performed by: PSYCHIATRY & NEUROLOGY

## 2024-09-30 RX ORDER — AMANTADINE HYDROCHLORIDE 100 MG/1
100 CAPSULE, GELATIN COATED ORAL
Qty: 30 CAPSULE | Refills: 5 | Status: SHIPPED | OUTPATIENT
Start: 2024-09-30

## 2024-09-30 NOTE — PROGRESS NOTES
elevation at 70 degrees.   Baseline supine HR: 57 bpm.   Baseline supine BP: 128/75.   Initial tilted HR: 62 bpm.   Initial tilted BP: 107/70.   Initial Tilt revealed symptoms such as (no S/S found).     The test was stopped due to the end of test duration. Outcome of Tilt: negative.   Noted to have a relatively lower BP throughout the test ranging 105-115 systolic  No changes otherwise with the HR and or BP during tilting     Assessment:     Diagnosis Orders   1. Parkinson's disease without dyskinesia, with fluctuating manifestations (HCC)        2. Bradykinesia        3. Hypophonia        4. Other fatigue             Follow up for parkinson's disease, bradykinesia, hypophonia. He reports doing better, no falls, benefited from therapy with the balance and energy, started rock steady boxing just recently. He was referred to speech therapy, helped with cognition. No reports dysphagia. he denies recent falls. Tilt table test was negative. He reports doing better since increasing the Sinemet, no side effects. He appears much improved, compared to last evaluation. After a discussion with patient we agreed on the following plan.      Plan:  Continue with Sinemet 25/100 mg to 2 tablets three times a day, take every 5 hours while awake, Take at 7am, 12pm, 5pm daily.   Start Amantadine 100 mg daily.   Stay active, and follow therapy recommendations.   Consider MRI brain without and with contrast next.   You need to stay physically active  Call with any new symptoms or concerns.   Follow up in 4 months         Total time 34 min    Prabhjot Gracia MD

## 2024-09-30 NOTE — PATIENT INSTRUCTIONS
Continue with Sinemet 25/100 mg to 2 tablets three times a day, take every 5 hours while awake, Take at 7am, 12pm, 5pm daily.   Start Amantadine 100 mg daily.   Stay active, and follow therapy recommendations.   You need to stay physically active  Call with any new symptoms or concerns.   Follow up in 4 months

## 2024-11-14 ENCOUNTER — OFFICE VISIT (OUTPATIENT)
Dept: PULMONOLOGY | Age: 69
End: 2024-11-14

## 2024-11-14 VITALS
BODY MASS INDEX: 27.05 KG/M2 | SYSTOLIC BLOOD PRESSURE: 122 MMHG | WEIGHT: 182.6 LBS | HEIGHT: 69 IN | TEMPERATURE: 97.5 F | OXYGEN SATURATION: 95 % | HEART RATE: 50 BPM | DIASTOLIC BLOOD PRESSURE: 78 MMHG

## 2024-11-14 DIAGNOSIS — G47.33 OSA (OBSTRUCTIVE SLEEP APNEA): Primary | ICD-10-CM

## 2024-11-14 DIAGNOSIS — G20.A1 PARKINSON'S DISEASE, UNSPECIFIED WHETHER DYSKINESIA PRESENT, UNSPECIFIED WHETHER MANIFESTATIONS FLUCTUATE (HCC): ICD-10-CM

## 2024-11-14 DIAGNOSIS — E66.3 OVERWEIGHT (BMI 25.0-29.9): ICD-10-CM

## 2024-11-14 RX ORDER — ATORVASTATIN CALCIUM 20 MG/1
TABLET, FILM COATED ORAL
COMMUNITY
Start: 2024-03-01

## 2024-11-14 ASSESSMENT — ENCOUNTER SYMPTOMS
COUGH: 0
SHORTNESS OF BREATH: 0
WHEEZING: 0
SORE THROAT: 0
RHINORRHEA: 0

## 2024-11-14 NOTE — PROGRESS NOTES
[facility]      Hugh Patel      Original or initial AHI: 37.8     Date of initial study: 2/15/24       [id number]  11/14/2024   Chief Complaint   Patient presents with    Follow-up     Quinn 3 month f/u with download, pt wants to discuss inspire        Pt of Dr. Chong    Assessment/Plan   1. QUINN (obstructive sleep apnea)  -Yearly supply order placed? Yes   -Download reviewed and discussed with patient.  -The symptoms of QUINN have improved. The patient is benefiting from therapy and should continue use of PAP device. I have reviewed the download.   -Patient advised better compliance with PAP. Advised to go to bed when feeling sleepy. Advised to bring PAP with him when traveling.  -Patient's symptoms and AHI are well controlled with current auto settings of 6-8 cmH2O. Continue current pressure settings.  -Advised to continue current positive airway pressure therapy with above described pressure.   -Advised to keep good compliance with current recommended pressure to get optimal results and clinical improvement  -Recommend 7-9 hours of sleep with PAP  -Instructed to call [employer] company regarding supplies if needed.   -Patient to call my office for earlier appointment if needed for worsening of sleep symptoms.   -Patient instructed not to drive if feeling sleepy    2. Overweight (BMI 25.0-29.9) - gained 2 lbs  -Counseled patient on weight loss    3. Parkinson's disease, unspecified whether dyskinesia present, unspecified whether manifestations fluctuate (HCC)  -Patient reports stable symptoms. Continue with neurology     Educated about my impression and plan. Patient verbalizes understanding.    Return in about 6 months (around 5/14/2025) for QUINN. with download.  Subjective     PAP Download:   Original or initial AHI: 37.8     Date of initial study: 2/15/24      Compliant  63%     Noncompliant 7 %     PAP Type APAP Level  6-8 cmH2O  Avg Hrs/Day 6 hours and 13 minutes  AHI:

## 2024-12-29 DIAGNOSIS — R49.8 HYPOPHONIA: ICD-10-CM

## 2024-12-29 DIAGNOSIS — G20.A2 PARKINSON'S DISEASE WITHOUT DYSKINESIA, WITH FLUCTUATING MANIFESTATIONS (HCC): ICD-10-CM

## 2024-12-29 DIAGNOSIS — R25.8 BRADYKINESIA: ICD-10-CM

## 2024-12-30 RX ORDER — CARBIDOPA AND LEVODOPA 25; 100 MG/1; MG/1
1 TABLET ORAL 3 TIMES DAILY
Qty: 180 TABLET | Refills: 5 | Status: SHIPPED | OUTPATIENT
Start: 2024-12-30

## 2024-12-30 NOTE — TELEPHONE ENCOUNTER
Please approve or deny     Last Visit Date:  9/30/2024       Next Visit Date:    1/27/2025 Paige Leopold CNP

## 2025-01-27 ENCOUNTER — OFFICE VISIT (OUTPATIENT)
Dept: NEUROLOGY | Age: 70
End: 2025-01-27
Payer: MEDICARE

## 2025-01-27 VITALS
BODY MASS INDEX: 26.93 KG/M2 | HEIGHT: 69 IN | WEIGHT: 181.8 LBS | OXYGEN SATURATION: 98 % | DIASTOLIC BLOOD PRESSURE: 66 MMHG | SYSTOLIC BLOOD PRESSURE: 124 MMHG | HEART RATE: 66 BPM

## 2025-01-27 DIAGNOSIS — R49.8 HYPOPHONIA: ICD-10-CM

## 2025-01-27 DIAGNOSIS — R25.8 BRADYKINESIA: ICD-10-CM

## 2025-01-27 DIAGNOSIS — G20.A2 PARKINSON'S DISEASE WITHOUT DYSKINESIA, WITH FLUCTUATING MANIFESTATIONS (HCC): Primary | ICD-10-CM

## 2025-01-27 PROCEDURE — 99214 OFFICE O/P EST MOD 30 MIN: CPT | Performed by: PSYCHIATRY & NEUROLOGY

## 2025-01-27 PROCEDURE — 1123F ACP DISCUSS/DSCN MKR DOCD: CPT | Performed by: PSYCHIATRY & NEUROLOGY

## 2025-01-27 PROCEDURE — 1036F TOBACCO NON-USER: CPT | Performed by: PSYCHIATRY & NEUROLOGY

## 2025-01-27 PROCEDURE — G8417 CALC BMI ABV UP PARAM F/U: HCPCS | Performed by: PSYCHIATRY & NEUROLOGY

## 2025-01-27 PROCEDURE — 3017F COLORECTAL CA SCREEN DOC REV: CPT | Performed by: PSYCHIATRY & NEUROLOGY

## 2025-01-27 PROCEDURE — G8427 DOCREV CUR MEDS BY ELIG CLIN: HCPCS | Performed by: PSYCHIATRY & NEUROLOGY

## 2025-01-27 PROCEDURE — 1159F MED LIST DOCD IN RCRD: CPT | Performed by: PSYCHIATRY & NEUROLOGY

## 2025-01-27 NOTE — PROGRESS NOTES
General Appearance:  alert and cooperative  Gen: NAD, Language is Intact. Skin: no rash, lesion, dry  to touch. warm  Head: no rash, no icterus  Neck: The Neck is supple.   Neuro: CN 2-12 grossly intact with no focal deficits. Power 5/5 Throughout symmetric, Reflexes are symmetric. Long tracts are intact. Cerebellar exam is Intact. Sensory exam is intact to light touch.  no resting tremor, no pinrolling +ve mild bradykinesia,  mild Hypohonia, +ve decreased blink rate, no difficulty exiting chair, arm swing is intact, he is not stooped forward,  Gait normal  Musculoskeletal:  Has no hand arthritis, no limitation of ROM in any of the four extremities.  Lower extremities no edema        DATA:         Results for orders placed or performed in visit on 09/04/24   PSA, Prostatic Specific Antigen   Result Value Ref Range    PSA               MRI BRAIN WO CONTRAST    Narrative  PROCEDURE: MRI BRAIN WO CONTRAST  CLINICAL INFORMATION Memory loss.  Several head injuries over the years.  COMPARISON: No prior study.  TECHNIQUE: Multiplanar and multiple spin echo MRI images were obtained of the brain without contrast.  FINDINGS:  Motion artifact does degrade the quality of some of these images. These are the best images possible at this time.  The diffusion-weighted images are normal.  The brain volume is reduced. There is minimal signal hyperintensity scattered in the white matter of the brain suggestive of minimal severity chronic small vessel ischemic changes.  There are no intra-or extra-axial collections.  There is no hydrocephalus, midline shift or mass effect.  There is mineralization in the basal ganglia bilaterally. The major intracranial vascular flow voids are present.  The midline craniocervical junction structures are normal.  The pituitary gland and brainstem are normal.  There is some mucosal thickening along the floors of the maxillary sinuses.  Impression  1. Mild global volume loss.  2. Minimal

## 2025-01-27 NOTE — PATIENT INSTRUCTIONS
Continue with Sinemet 25/100 mg to 2 tablets three times a day, take every 5 hours while awake, Take at 7am, 12pm, 5pm daily.   Amantadine 100 mg daily.   Stay active, and follow therapy recommendations.   Call with any new symptoms or concerns.   Follow up in 5 months

## 2025-03-29 DIAGNOSIS — R25.8 BRADYKINESIA: ICD-10-CM

## 2025-03-29 DIAGNOSIS — R53.83 OTHER FATIGUE: ICD-10-CM

## 2025-03-29 DIAGNOSIS — G20.A2 PARKINSON'S DISEASE WITHOUT DYSKINESIA, WITH FLUCTUATING MANIFESTATIONS (HCC): ICD-10-CM

## 2025-03-29 DIAGNOSIS — R49.8 HYPOPHONIA: ICD-10-CM

## 2025-03-31 RX ORDER — AMANTADINE HYDROCHLORIDE 100 MG/1
CAPSULE, GELATIN COATED ORAL
Qty: 30 CAPSULE | Refills: 5 | Status: SHIPPED | OUTPATIENT
Start: 2025-03-31

## 2025-03-31 NOTE — TELEPHONE ENCOUNTER
We have received a refill request on the following medications:  Requested Prescriptions     Pending Prescriptions Disp Refills    amantadine (SYMMETREL) 100 MG capsule [Pharmacy Med Name: AMANTADINE 100 MG CAPSULE] 30 capsule 5     Sig: TAKE 1 CAPSULE BY MOUTH DAILY WITH BREAKFAST       Date of last visit: 1/27/2025  Date of next visit (if applicable):6/27/2025  Date of last refill: 09/30/2024  Pharmacy Name: [facility]     Please approve or deny.

## 2025-04-21 DIAGNOSIS — G20.A2 PARKINSON'S DISEASE WITHOUT DYSKINESIA, WITH FLUCTUATING MANIFESTATIONS (HCC): ICD-10-CM

## 2025-04-21 DIAGNOSIS — R25.8 BRADYKINESIA: ICD-10-CM

## 2025-04-21 DIAGNOSIS — R49.8 HYPOPHONIA: ICD-10-CM

## 2025-04-21 RX ORDER — CARBIDOPA AND LEVODOPA 25; 100 MG/1; MG/1
TABLET ORAL
Qty: 540 TABLET | Refills: 1 | Status: SHIPPED | OUTPATIENT
Start: 2025-04-21

## 2025-04-21 NOTE — TELEPHONE ENCOUNTER
Please approve or deny     Last Visit Date:  1/27/2025       Next Visit Date:    6/27/2025  Paige Leopold CNP

## 2025-05-26 NOTE — PROGRESS NOTES
[facility]      Hugh Patel         [id number]  5/27/2025   Chief Complaint   Patient presents with    Follow-up     6mo HELEN f/u w/o download.  Last used 3/19/25.  Notes he stopped wearing his PAP d/t fighting with the mask and hose and pulling on it all night long. \"I felt I wasn't sleeping well with it.\"        Pt of Dr. Chong    Assessment/Plan     Assessment & Plan      1. HELEN (obstructive sleep apnea)  -DME order for CPAP supplies and mask refit  -Patient's symptoms are not optimally controlled due to patient not using PAP therapy. Reports leaking and mask slipping. Will have patient complete mask refit and follow up in 3 months to re-evaluate.   -Download reviewed and discussed with patient  -Discussed sleep hygiene in depth. Advised patient not to take daytime naps and to not watch TV about 30-60 minutes before bedtime  -Advised patient to work on PAP compliance. Wearing every night and wearing for his full sleep duration   -Patient reports symptoms of HELEN have improved. The patient is benefiting from therapy and would like to continue use of PAP device. I have reviewed the download.   -Continue current recommended pressure to get optimal results and clinical improvement. Advised to go to bed when feeling sleepy   -He was advised to trim his mustache to get a good seal with his current mask.  -He was educated and advised to apply his current mask properly and tight enough to minimize leaks.     -Recommend 7-9 hours of sleep with PAP  -Instructed to call DME company regarding supplies if needed.   -Patient to call my office for earlier appointment if needed for worsening of sleep symptoms.   -Advised patient not to drive or operate heavy machinery if feeling sleepy    2. Overweight (BMI 25.0-29.9)  -Counseled patient on weight loss  -Weight lost 11 lbs over 6 months. Patient is doing boxing twice a week for exercise and to help with Parkinson's. Follows with

## 2025-05-27 ENCOUNTER — OFFICE VISIT (OUTPATIENT)
Age: 70
End: 2025-05-27
Payer: MEDICARE

## 2025-05-27 VITALS
BODY MASS INDEX: 25.45 KG/M2 | HEART RATE: 54 BPM | OXYGEN SATURATION: 94 % | WEIGHT: 171.8 LBS | TEMPERATURE: 97.6 F | HEIGHT: 69 IN | SYSTOLIC BLOOD PRESSURE: 112 MMHG | DIASTOLIC BLOOD PRESSURE: 60 MMHG

## 2025-05-27 DIAGNOSIS — G47.33 OSA (OBSTRUCTIVE SLEEP APNEA): Primary | ICD-10-CM

## 2025-05-27 DIAGNOSIS — E66.3 OVERWEIGHT (BMI 25.0-29.9): ICD-10-CM

## 2025-05-27 PROCEDURE — 1160F RVW MEDS BY RX/DR IN RCRD: CPT

## 2025-05-27 PROCEDURE — G8417 CALC BMI ABV UP PARAM F/U: HCPCS

## 2025-05-27 PROCEDURE — 1159F MED LIST DOCD IN RCRD: CPT

## 2025-05-27 PROCEDURE — 1123F ACP DISCUSS/DSCN MKR DOCD: CPT

## 2025-05-27 PROCEDURE — G8427 DOCREV CUR MEDS BY ELIG CLIN: HCPCS

## 2025-05-27 PROCEDURE — 1036F TOBACCO NON-USER: CPT

## 2025-05-27 PROCEDURE — 3017F COLORECTAL CA SCREEN DOC REV: CPT

## 2025-05-27 PROCEDURE — 99214 OFFICE O/P EST MOD 30 MIN: CPT

## 2025-06-20 ENCOUNTER — OFFICE VISIT (OUTPATIENT)
Dept: NEUROLOGY | Age: 70
End: 2025-06-20
Payer: MEDICARE

## 2025-06-20 VITALS
HEIGHT: 69 IN | WEIGHT: 169.5 LBS | HEART RATE: 49 BPM | BODY MASS INDEX: 25.1 KG/M2 | DIASTOLIC BLOOD PRESSURE: 60 MMHG | SYSTOLIC BLOOD PRESSURE: 104 MMHG

## 2025-06-20 DIAGNOSIS — R49.8 HYPOPHONIA: ICD-10-CM

## 2025-06-20 DIAGNOSIS — G20.A2 PARKINSON'S DISEASE WITHOUT DYSKINESIA, WITH FLUCTUATING MANIFESTATIONS (HCC): Primary | ICD-10-CM

## 2025-06-20 DIAGNOSIS — R25.8 BRADYKINESIA: ICD-10-CM

## 2025-06-20 PROCEDURE — 99214 OFFICE O/P EST MOD 30 MIN: CPT | Performed by: NURSE PRACTITIONER

## 2025-06-20 PROCEDURE — G8427 DOCREV CUR MEDS BY ELIG CLIN: HCPCS | Performed by: NURSE PRACTITIONER

## 2025-06-20 PROCEDURE — 1123F ACP DISCUSS/DSCN MKR DOCD: CPT | Performed by: NURSE PRACTITIONER

## 2025-06-20 PROCEDURE — 3017F COLORECTAL CA SCREEN DOC REV: CPT | Performed by: NURSE PRACTITIONER

## 2025-06-20 PROCEDURE — 1036F TOBACCO NON-USER: CPT | Performed by: NURSE PRACTITIONER

## 2025-06-20 PROCEDURE — G8417 CALC BMI ABV UP PARAM F/U: HCPCS | Performed by: NURSE PRACTITIONER

## 2025-06-20 PROCEDURE — 1159F MED LIST DOCD IN RCRD: CPT | Performed by: NURSE PRACTITIONER

## 2025-06-20 NOTE — PROGRESS NOTES
NEUROLOGY OUT PATIENT FOLLOW UP NOTE:  6/20/202510:34 AM    Hugh Patel is here for follow up for parkinson's disease.           No Known Allergies    Current Outpatient Medications:     carbidopa-levodopa (SINEMET)  MG per tablet, TAKE TWO TABLETS BY MOUTH THREE TIMES A DAY, Disp: 540 tablet, Rfl: 1    amantadine (SYMMETREL) 100 MG capsule, TAKE 1 CAPSULE BY MOUTH DAILY WITH BREAKFAST, Disp: 30 capsule, Rfl: 5    atorvastatin (LIPITOR) 20 MG tablet, Take by mouth, Disp: , Rfl:     MAGNESIUM PO, Take by mouth, Disp: , Rfl:     POTASSIUM CITRATE PO, Take by mouth, Disp: , Rfl:     Misc. Devices (CPAP MACHINE) MISC, by Does not apply route Please change his current CPAP/BiPAP pressure to a CPAP Auto Adjust 6 - 8 cm H2O  Please pull download in 2 weeks (Patient taking differently: by Does not apply route Please change his current CPAP/BiPAP pressure to a CPAP Auto Adjust 6 - 8 cm H2O  Please pull download in 2 weeks   Patient states he needs to have it refitted. Currently not using), Disp: 1 each, Rfl: 0    aspirin 81 MG EC tablet, Take by mouth, Disp: , Rfl:     ibuprofen (ADVIL;MOTRIN) 200 MG tablet, Take by mouth, Disp: , Rfl:     allopurinol (ZYLOPRIM) 300 MG tablet, Take 1 tablet by mouth daily, Disp: , Rfl:     Cholecalciferol (VITAMIN D3) 125 MCG (5000 UT) TABS, Take by mouth daily, Disp: , Rfl:     IODINE-POTASSIUM IODIDE PO, Take by mouth Iodoral IOD 12.5 -- 1 tablet daily, Disp: , Rfl:     FOLIC ACID PO, Take by mouth, Disp: , Rfl:     NONFORMULARY, Sports salts (Patient not taking: Reported on 6/20/2025), Disp: , Rfl:     METAMUCIL FIBER PO, Take by mouth (Patient not taking: Reported on 6/20/2025), Disp: , Rfl:     GENERIC EXTERNAL MEDICATION, Liposomal glutathone (Patient not taking: Reported on 6/20/2025), Disp: , Rfl:     GENERIC EXTERNAL MEDICATION, Curamin Pain Relief 4 capsules daily (Patient not taking: Reported on 6/20/2025), Disp: , Rfl:     I reviewed the past medical history, allergies,

## 2025-09-02 DIAGNOSIS — G47.33 OSA (OBSTRUCTIVE SLEEP APNEA): Primary | ICD-10-CM

## 2025-09-03 DIAGNOSIS — R49.8 HYPOPHONIA: ICD-10-CM

## 2025-09-03 DIAGNOSIS — R25.8 BRADYKINESIA: ICD-10-CM

## 2025-09-03 DIAGNOSIS — G20.A2 PARKINSON'S DISEASE WITHOUT DYSKINESIA, WITH FLUCTUATING MANIFESTATIONS (HCC): ICD-10-CM

## 2025-09-03 DIAGNOSIS — R53.83 OTHER FATIGUE: ICD-10-CM

## 2025-09-04 DIAGNOSIS — C61 PROSTATE CANCER (HCC): Primary | ICD-10-CM

## 2025-09-04 RX ORDER — AMANTADINE HYDROCHLORIDE 100 MG/1
100 CAPSULE, GELATIN COATED ORAL DAILY
Qty: 30 CAPSULE | Refills: 3 | Status: SHIPPED | OUTPATIENT
Start: 2025-09-04